# Patient Record
Sex: FEMALE | Race: WHITE | NOT HISPANIC OR LATINO | Employment: FULL TIME | URBAN - METROPOLITAN AREA
[De-identification: names, ages, dates, MRNs, and addresses within clinical notes are randomized per-mention and may not be internally consistent; named-entity substitution may affect disease eponyms.]

---

## 2017-08-31 ENCOUNTER — PATIENT OUTREACH (OUTPATIENT)
Dept: ADMINISTRATIVE | Facility: HOSPITAL | Age: 47
End: 2017-08-31

## 2017-08-31 NOTE — PROGRESS NOTES
Contact pt to schedule a physical. Pt has not been seen in our office over 12 months. Pt decline appt at this time.  Stated she has a lot going on and will call later. Letter sent.

## 2017-08-31 NOTE — LETTER
August 31, 2017    Amita Bhakta  [address] [address]           [facility]  [address]  [place] [address]  Phone: [phone] Dear Amita Bhakta    In the spirit of maintaining your good health, our system indicates that you have not been seen in the office in over 12 months and due for a visit.     Our system indicates that you are due for the following:   Health Maintenance Due   Topic Date Due    TETANUS VACCINE  10/05/1989    Influenza Vaccine  08/01/2017     Beltran Velasco MD would like you to schedule an appointment for an annual exam at your earliest convenience. If you have completed any of these health maintenance requirements at an outside facility, please contact our office so we may update your health record.    If your PRIMARY CARE PHYSICIAN has changed please contact your insurance company to reflect the correct physician.    If you have any issues or need assistance in scheduling this appointment, please call the number below.     FELIX Arora  Care Coordination Department  [facility]  [phone]

## 2018-06-07 ENCOUNTER — OFFICE VISIT (OUTPATIENT)
Dept: FAMILY MEDICINE | Facility: CLINIC | Age: 48
End: 2018-06-07
Payer: COMMERCIAL

## 2018-06-07 ENCOUNTER — LAB VISIT (OUTPATIENT)
Dept: LAB | Facility: HOSPITAL | Age: 48
End: 2018-06-07
Attending: NURSE PRACTITIONER
Payer: COMMERCIAL

## 2018-06-07 VITALS
DIASTOLIC BLOOD PRESSURE: 72 MMHG | TEMPERATURE: 98 F | BODY MASS INDEX: 25.04 KG/M2 | SYSTOLIC BLOOD PRESSURE: 113 MMHG | HEART RATE: 83 BPM | HEIGHT: 66 IN | WEIGHT: 155.81 LBS

## 2018-06-07 DIAGNOSIS — Z12.31 ENCOUNTER FOR SCREENING MAMMOGRAM FOR BREAST CANCER: ICD-10-CM

## 2018-06-07 DIAGNOSIS — R53.83 FATIGUE, UNSPECIFIED TYPE: Primary | ICD-10-CM

## 2018-06-07 DIAGNOSIS — R53.83 FATIGUE, UNSPECIFIED TYPE: ICD-10-CM

## 2018-06-07 DIAGNOSIS — R00.2 PALPITATIONS: ICD-10-CM

## 2018-06-07 LAB
ALBUMIN SERPL BCP-MCNC: 4 G/DL
ALP SERPL-CCNC: 57 U/L
ALT SERPL W/O P-5'-P-CCNC: 15 U/L
ANION GAP SERPL CALC-SCNC: 9 MMOL/L
AST SERPL-CCNC: 18 U/L
BASOPHILS # BLD AUTO: 0.04 K/UL
BASOPHILS NFR BLD: 0.8 %
BILIRUB SERPL-MCNC: 0.8 MG/DL
BILIRUB UR QL STRIP: NEGATIVE
BUN SERPL-MCNC: 19 MG/DL
CALCIUM SERPL-MCNC: 9.4 MG/DL
CHLORIDE SERPL-SCNC: 105 MMOL/L
CLARITY UR: CLEAR
CO2 SERPL-SCNC: 27 MMOL/L
COLOR UR: YELLOW
CREAT SERPL-MCNC: 0.9 MG/DL
DIFFERENTIAL METHOD: ABNORMAL
EOSINOPHIL # BLD AUTO: 0.1 K/UL
EOSINOPHIL NFR BLD: 2.7 %
ERYTHROCYTE [DISTWIDTH] IN BLOOD BY AUTOMATED COUNT: 12.4 %
EST. GFR  (AFRICAN AMERICAN): >60 ML/MIN/1.73 M^2
EST. GFR  (NON AFRICAN AMERICAN): >60 ML/MIN/1.73 M^2
FSH SERPL-ACNC: 117.2 MIU/ML
GLUCOSE SERPL-MCNC: 87 MG/DL
GLUCOSE UR QL STRIP: NEGATIVE
HCT VFR BLD AUTO: 40.5 %
HGB BLD-MCNC: 13.3 G/DL
HGB UR QL STRIP: NEGATIVE
IMM GRANULOCYTES # BLD AUTO: 0.01 K/UL
IMM GRANULOCYTES NFR BLD AUTO: 0.2 %
KETONES UR QL STRIP: NEGATIVE
LEUKOCYTE ESTERASE UR QL STRIP: NEGATIVE
LH SERPL-ACNC: 52.2 MIU/ML
LYMPHOCYTES # BLD AUTO: 1.2 K/UL
LYMPHOCYTES NFR BLD: 25.9 %
MCH RBC QN AUTO: 32.5 PG
MCHC RBC AUTO-ENTMCNC: 32.8 G/DL
MCV RBC AUTO: 99 FL
MONOCYTES # BLD AUTO: 0.4 K/UL
MONOCYTES NFR BLD: 7.6 %
NEUTROPHILS # BLD AUTO: 3 K/UL
NEUTROPHILS NFR BLD: 62.8 %
NITRITE UR QL STRIP: NEGATIVE
NRBC BLD-RTO: 0 /100 WBC
PH UR STRIP: 6.5 [PH] (ref 5–8)
PLATELET # BLD AUTO: 174 K/UL
PMV BLD AUTO: 11.5 FL
POTASSIUM SERPL-SCNC: 4.5 MMOL/L
PROT SERPL-MCNC: 7.8 G/DL
PROT UR QL STRIP: NEGATIVE
RBC # BLD AUTO: 4.09 M/UL
SODIUM SERPL-SCNC: 141 MMOL/L
SP GR UR STRIP: 1.01 (ref 1–1.03)
TSH SERPL DL<=0.005 MIU/L-ACNC: 2 UIU/ML
URN SPEC COLLECT METH UR: NORMAL
VIT B12 SERPL-MCNC: 990 PG/ML
WBC # BLD AUTO: 4.75 K/UL

## 2018-06-07 PROCEDURE — 81002 URINALYSIS NONAUTO W/O SCOPE: CPT | Mod: PO

## 2018-06-07 PROCEDURE — 83001 ASSAY OF GONADOTROPIN (FSH): CPT

## 2018-06-07 PROCEDURE — 93005 ELECTROCARDIOGRAM TRACING: CPT | Mod: S$GLB,,, | Performed by: NURSE PRACTITIONER

## 2018-06-07 PROCEDURE — 85025 COMPLETE CBC W/AUTO DIFF WBC: CPT

## 2018-06-07 PROCEDURE — 99999 PR PBB SHADOW E&M-EST. PATIENT-LVL III: CPT | Mod: PBBFAC,,, | Performed by: NURSE PRACTITIONER

## 2018-06-07 PROCEDURE — 84443 ASSAY THYROID STIM HORMONE: CPT

## 2018-06-07 PROCEDURE — 36415 COLL VENOUS BLD VENIPUNCTURE: CPT | Mod: PO

## 2018-06-07 PROCEDURE — 3008F BODY MASS INDEX DOCD: CPT | Mod: CPTII,S$GLB,, | Performed by: NURSE PRACTITIONER

## 2018-06-07 PROCEDURE — 99203 OFFICE O/P NEW LOW 30 MIN: CPT | Mod: 25,S$GLB,, | Performed by: NURSE PRACTITIONER

## 2018-06-07 PROCEDURE — 83002 ASSAY OF GONADOTROPIN (LH): CPT

## 2018-06-07 PROCEDURE — 80053 COMPREHEN METABOLIC PANEL: CPT

## 2018-06-07 PROCEDURE — 82607 VITAMIN B-12: CPT

## 2018-06-07 PROCEDURE — 93010 ELECTROCARDIOGRAM REPORT: CPT | Mod: S$GLB,,, | Performed by: NUCLEAR MEDICINE

## 2018-06-07 RX ORDER — PHENTERMINE HYDROCHLORIDE 37.5 MG/1
37.5 TABLET ORAL DAILY
Refills: 0 | COMMUNITY
Start: 2018-05-30 | End: 2019-06-18

## 2018-06-07 NOTE — PROGRESS NOTES
"Subjective:      Patient ID: Amita Bhakta is a 47 y.o. female.    Chief Complaint: Fatigue and Headache    HPI   Patient to clinic with complaint of fatigue.  She reports she has 1st noticed she was tiring easily around May 28th.  She reports she has progressively gotten worse since that time.  She has had anemia due to gastric ulcer in the past and she reports she felt the same way at that time.  She denies upset stomach, blood in stool, dark stools, abdominal pain, acid reflux.  She reports she has no energy to do anything.  She also reports that she feels like her heart rate races when she gets up to begin doing anything after being sedentary for some time.  She reports that it feels like her heart is racing away.  She denies shortness of breath and chest pain.  She reports the only other thing she can think of associated with this is having urinary frequency.  She denies fever, lower abdominal pain, flank pain, hematuria, dysuria, urgency.  She has had hysterectomy, but her ovaries are intact. She reports she has previously been experiencing hot flashes but they have now resolved.  She complains of no other symptoms today.     Review of Systems   Constitutional: Positive for activity change and fatigue. Negative for appetite change, chills and fever.   Respiratory: Negative for cough, shortness of breath and wheezing.    Cardiovascular: Positive for palpitations. Negative for chest pain and leg swelling.   Gastrointestinal: Negative for abdominal distention, abdominal pain and blood in stool.   Genitourinary: Positive for frequency. Negative for difficulty urinating, dysuria, flank pain, hematuria and urgency.   Skin: Negative for rash and wound.   Neurological: Negative for weakness and numbness.   Psychiatric/Behavioral: The patient is not nervous/anxious.        Objective:     /72   Pulse 83   Temp 98.2 °F (36.8 °C)   Ht 5' 5.5" (1.664 m)   Wt 70.7 kg (155 lb 12.8 oz)   BMI 25.53 kg/m² "     Physical Exam   Constitutional: She is oriented to person, place, and time. She appears well-developed and well-nourished. No distress.   HENT:   Head: Normocephalic and atraumatic.   Right Ear: Tympanic membrane normal.   Left Ear: Tympanic membrane normal.   Nose: Nose normal. Right sinus exhibits no maxillary sinus tenderness and no frontal sinus tenderness. Left sinus exhibits no maxillary sinus tenderness and no frontal sinus tenderness.   Mouth/Throat: Oropharynx is clear and moist and mucous membranes are normal.   Eyes: EOM are normal. Pupils are equal, round, and reactive to light.   Neck: Normal range of motion. Neck supple. Carotid bruit is not present.   Cardiovascular: Normal rate, regular rhythm and normal heart sounds.    Pulses:       Carotid pulses are 2+ on the right side, and 2+ on the left side.       Dorsalis pedis pulses are 2+ on the right side, and 2+ on the left side.        Posterior tibial pulses are 2+ on the right side, and 2+ on the left side.   Pulmonary/Chest: Effort normal and breath sounds normal. No respiratory distress. She has no wheezes. She has no rhonchi. She has no rales.   Abdominal: Soft. Bowel sounds are normal. There is no tenderness.   Musculoskeletal: Normal range of motion. She exhibits no edema.   Lymphadenopathy:     She has no cervical adenopathy.   Neurological: She is alert and oriented to person, place, and time.   Skin: Skin is warm and dry. No rash noted.   Psychiatric: She has a normal mood and affect. Her behavior is normal. Judgment and thought content normal.   Vitals reviewed.    Assessment:     1. Fatigue, unspecified type    2. Palpitations    3. Encounter for screening mammogram for breast cancer        Plan:     Problem List Items Addressed This Visit     None      Visit Diagnoses     Fatigue, unspecified type    -  Primary    Relevant Orders    Comprehensive metabolic panel (Completed)    CBC auto differential (Completed)    Urinalysis (Completed)     TSH (Completed)    IN OFFICE EKG 12-LEAD (to Muse)    Vitamin B12 (Completed)    Follicle stimulating hormone (Completed)    Luteinizing hormone (Completed)    Palpitations        Relevant Orders    IN OFFICE EKG 12-LEAD (to Muse)    Encounter for screening mammogram for breast cancer        Relevant Orders    Mammo Digital Screening Bilateral With CAD      Hydrate well  Eat at regular intervals  Report to ER if symptoms worsen    Follow-up if symptoms worsen or fail to improve.

## 2018-06-08 ENCOUNTER — TELEPHONE (OUTPATIENT)
Dept: FAMILY MEDICINE | Facility: CLINIC | Age: 48
End: 2018-06-08

## 2018-06-08 NOTE — TELEPHONE ENCOUNTER
Pt would like to know what the plan is regarding her lab results. States she knows she is post menopausal. Does she need to see someone regarding this. Please advisel.

## 2018-06-08 NOTE — TELEPHONE ENCOUNTER
----- Message from Madison Brand sent at 6/8/2018  3:42 PM CDT -----  Contact: PT   Pt request call back to discuss her test results and her next steps.. [phone] .[phone] (home)

## 2018-06-11 ENCOUNTER — TELEPHONE (OUTPATIENT)
Dept: FAMILY MEDICINE | Facility: CLINIC | Age: 48
End: 2018-06-11

## 2018-06-11 NOTE — TELEPHONE ENCOUNTER
Spoke w/pt and she has an appointment scheduled for 6/18/18 with Faith Green. Faxed lab results to his office via computer per pt request.

## 2018-06-11 NOTE — TELEPHONE ENCOUNTER
----- Message from Brittney Robledo sent at 6/11/2018  8:35 AM CDT -----  Contact: pt  She's returning a missed call, please advise [phone]

## 2018-06-14 ENCOUNTER — HOSPITAL ENCOUNTER (OUTPATIENT)
Dept: RADIOLOGY | Facility: HOSPITAL | Age: 48
Discharge: HOME | End: 2018-06-14
Attending: NURSE PRACTITIONER
Payer: COMMERCIAL

## 2018-06-14 VITALS — BODY MASS INDEX: 25.05 KG/M2 | HEIGHT: 66 IN | WEIGHT: 155.88 LBS

## 2018-06-14 DIAGNOSIS — Z12.31 ENCOUNTER FOR SCREENING MAMMOGRAM FOR BREAST CANCER: ICD-10-CM

## 2018-06-14 PROCEDURE — 77063 BREAST TOMOSYNTHESIS BI: CPT | Mod: 26,,, | Performed by: RADIOLOGY

## 2018-06-14 PROCEDURE — 77067 SCR MAMMO BI INCL CAD: CPT | Mod: 26,,, | Performed by: RADIOLOGY

## 2018-06-14 PROCEDURE — 77067 SCR MAMMO BI INCL CAD: CPT | Mod: TC,PO

## 2019-06-18 ENCOUNTER — OFFICE VISIT (OUTPATIENT)
Dept: FAMILY MEDICINE | Facility: CLINIC | Age: 49
End: 2019-06-18
Payer: COMMERCIAL

## 2019-06-18 VITALS
SYSTOLIC BLOOD PRESSURE: 116 MMHG | BODY MASS INDEX: 25.49 KG/M2 | WEIGHT: 153 LBS | DIASTOLIC BLOOD PRESSURE: 66 MMHG | HEART RATE: 80 BPM | TEMPERATURE: 98 F | HEIGHT: 65 IN

## 2019-06-18 DIAGNOSIS — K25.3 ACUTE GASTRIC ULCER WITHOUT HEMORRHAGE OR PERFORATION: ICD-10-CM

## 2019-06-18 DIAGNOSIS — F41.9 ANXIETY: Primary | ICD-10-CM

## 2019-06-18 DIAGNOSIS — Z63.4 BEREAVEMENT: ICD-10-CM

## 2019-06-18 PROCEDURE — 3008F BODY MASS INDEX DOCD: CPT | Mod: CPTII,S$GLB,, | Performed by: FAMILY MEDICINE

## 2019-06-18 PROCEDURE — 99214 PR OFFICE/OUTPT VISIT, EST, LEVL IV, 30-39 MIN: ICD-10-PCS | Mod: S$GLB,,, | Performed by: FAMILY MEDICINE

## 2019-06-18 PROCEDURE — 99214 OFFICE O/P EST MOD 30 MIN: CPT | Mod: S$GLB,,, | Performed by: FAMILY MEDICINE

## 2019-06-18 PROCEDURE — 3008F PR BODY MASS INDEX (BMI) DOCUMENTED: ICD-10-PCS | Mod: CPTII,S$GLB,, | Performed by: FAMILY MEDICINE

## 2019-06-18 PROCEDURE — 99999 PR PBB SHADOW E&M-EST. PATIENT-LVL III: CPT | Mod: PBBFAC,,, | Performed by: FAMILY MEDICINE

## 2019-06-18 PROCEDURE — 99999 PR PBB SHADOW E&M-EST. PATIENT-LVL III: ICD-10-PCS | Mod: PBBFAC,,, | Performed by: FAMILY MEDICINE

## 2019-06-18 RX ORDER — ALPRAZOLAM 0.25 MG/1
0.25 TABLET ORAL 3 TIMES DAILY PRN
Qty: 15 TABLET | Refills: 0 | Status: SHIPPED | OUTPATIENT
Start: 2019-06-18 | End: 2021-04-14 | Stop reason: SDUPTHER

## 2019-06-18 RX ORDER — PANTOPRAZOLE SODIUM 40 MG/1
TABLET, DELAYED RELEASE ORAL
Refills: 5 | COMMUNITY
Start: 2019-05-14 | End: 2020-05-05 | Stop reason: SDUPTHER

## 2019-06-18 RX ORDER — SERTRALINE HYDROCHLORIDE 50 MG/1
TABLET, FILM COATED ORAL
Qty: 30 TABLET | Refills: 5 | Status: SHIPPED | OUTPATIENT
Start: 2019-06-18 | End: 2020-01-15

## 2019-06-18 RX ORDER — ESTRADIOL 2 MG/1
2 TABLET ORAL DAILY
COMMUNITY

## 2019-06-18 RX ORDER — SUCRALFATE 1 G/10ML
SUSPENSION ORAL
Refills: 6 | COMMUNITY
Start: 2019-04-18 | End: 2021-04-28

## 2019-06-18 SDOH — SOCIAL DETERMINANTS OF HEALTH (SDOH): DISSAPEARANCE AND DEATH OF FAMILY MEMBER: Z63.4

## 2019-06-18 NOTE — PROGRESS NOTES
Patient presents with a complaint of anxiety and depression symptoms.  She has had some chronic anxiety.  Previous treatment with Zoloft about 5 years ago.  Previous counseling 20 years ago with a divorce.  She had a lot of stress during the past year with her son recovering from drug addiction.  Her mother passed away from a stroke this past week which has caused significant increased symptoms.  Did not feel like she was depressed prior to this.  She denies SI/HI.  She wants to take a couple of months off with FMLA/sick leave    We did discuss gastric ulcer.  Symptoms apparently had improved, possibly recurring.  She is due for follow-up endoscopy.  She is scheduling this through her GI doctor.    Amita was seen today for depression and anxiety.    Diagnoses and all orders for this visit:    Anxiety    Bereavement    Acute gastric ulcer without hemorrhage or perforation    Other orders  -     sertraline (ZOLOFT) 50 MG tablet; Take 1/2 po daily for 1 week, then take 1 daily  -     ALPRAZolam (XANAX) 0.25 MG tablet; Take 1 tablet (0.25 mg total) by mouth 3 (three) times daily as needed for Anxiety.     [place] paperwork off until July 22nd.  follow-up appointment in 1 month.  I would encourage her being able to get back to regular work and normal functioning as soon as practicable.  Recommend check into formal counseling with someone in her insurance network.  Follow-up with GI    25 min greater than 50% counseling            Past Medical History:  Past Medical History:   Diagnosis Date    Anxiety     Depression     Gastric ulcer with hemorrhage     H pylori ulcer     History of blood transfusion     Migraine headache      Past Surgical History:   Procedure Laterality Date    BREAST BIOPSY      HAND TENDON SURGERY      HYSTERECTOMY      ovaries intact    TYMPANOSTOMY      TYMPANOSTOMY TUBE PLACEMENT      urethral dilation       Review of patient's allergies indicates:   Allergen Reactions    Penicillins       Other reaction(s): Unknown     Current Outpatient Medications on File Prior to Visit   Medication Sig Dispense Refill    acetaminophen (TYLENOL) 325 MG tablet Take 325 mg by mouth every 6 (six) hours as needed for Pain.      CARAFATE 100 mg/mL suspension TAKE TWO TEASPOONSFUL (10 MLS) BY MOUTH FOUR TIMES DAILY  6    estradiol (ESTRACE) 2 MG tablet Take 2 mg by mouth once daily.      pantoprazole (PROTONIX) 40 MG tablet TAKE 1 TABLET BY MOUTH 30 MINUTES before BREAKFAST EVERY DAY  5    prasterone, dhea, (INTRAROSA) 6.5 mg Inst Place 6.5 mg vaginally once daily.      [DISCONTINUED] phentermine (ADIPEX-P) 37.5 mg tablet Take 37.5 mg by mouth once daily.  0     No current facility-administered medications on file prior to visit.      Social History     Socioeconomic History    Marital status:      Spouse name: Not on file    Number of children: Not on file    Years of education: Not on file    Highest education level: Not on file   Occupational History     Employer: [employer]   Social Needs    Financial resource strain: Not on file    Food insecurity:     Worry: Not on file     Inability: Not on file    Transportation needs:     Medical: Not on file     Non-medical: Not on file   Tobacco Use    Smoking status: Never Smoker   Substance and Sexual Activity    Alcohol use: Yes     Comment: occ    Drug use: No    Sexual activity: Yes     Partners: Male   Lifestyle    Physical activity:     Days per week: Not on file     Minutes per session: Not on file    Stress: Not on file   Relationships    Social connections:     Talks on phone: Not on file     Gets together: Not on file     Attends Hindu service: Not on file     Active member of club or organization: Not on file     Attends meetings of clubs or organizations: Not on file     Relationship status: Not on file   Other Topics Concern    Not on file   Social History Narrative    Not on file     Family History   Problem Relation Age  "of Onset    Heart disease Maternal Grandmother     Diabetes Maternal Grandmother     Hypertension Father     Hypertension Mother     Stroke Mother 72           ROS:  GENERAL: No fever, chills,  or significant weight changes.   CARDIOVASCULAR: Denies chest pain, PND, orthopnea or reduced exercise tolerance.  ABDOMEN: Appetite diminished.   URINARY: No flank pain, dysuria or hematuria.    Vitals:    06/18/19 1039   BP: 116/66   Pulse: 80   Temp: 98.1 °F (36.7 °C)   TempSrc: Oral   Weight: 69.4 kg (153 lb)   Height: 5' 5" (1.651 m)       Wt Readings from Last 3 Encounters:   06/18/19 69.4 kg (153 lb)   06/14/18 70.7 kg (155 lb 13.8 oz)   06/07/18 70.7 kg (155 lb 12.8 oz)       APPEARANCE: Well nourished, well developed, in no acute distress.    MENTAL STATUS: Alert.  Oriented x 3.  "

## 2019-07-12 ENCOUNTER — OFFICE VISIT (OUTPATIENT)
Dept: FAMILY MEDICINE | Facility: CLINIC | Age: 49
End: 2019-07-12
Payer: COMMERCIAL

## 2019-07-12 ENCOUNTER — HOSPITAL ENCOUNTER (OUTPATIENT)
Dept: RADIOLOGY | Facility: HOSPITAL | Age: 49
Discharge: HOME | End: 2019-07-12
Attending: NURSE PRACTITIONER
Payer: COMMERCIAL

## 2019-07-12 VITALS
HEART RATE: 60 BPM | WEIGHT: 153.88 LBS | DIASTOLIC BLOOD PRESSURE: 64 MMHG | SYSTOLIC BLOOD PRESSURE: 100 MMHG | BODY MASS INDEX: 24.73 KG/M2 | TEMPERATURE: 98 F | HEIGHT: 66 IN

## 2019-07-12 DIAGNOSIS — R53.83 FATIGUE, UNSPECIFIED TYPE: ICD-10-CM

## 2019-07-12 DIAGNOSIS — B34.9 VIRAL ILLNESS: ICD-10-CM

## 2019-07-12 DIAGNOSIS — I49.9 IRREGULAR HEART RATE: ICD-10-CM

## 2019-07-12 DIAGNOSIS — Z09 FOLLOW UP: Primary | ICD-10-CM

## 2019-07-12 DIAGNOSIS — R05.9 COUGH: ICD-10-CM

## 2019-07-12 DIAGNOSIS — R52 BODY ACHES: ICD-10-CM

## 2019-07-12 DIAGNOSIS — R21 RASH: ICD-10-CM

## 2019-07-12 DIAGNOSIS — R51.9 NONINTRACTABLE HEADACHE, UNSPECIFIED CHRONICITY PATTERN, UNSPECIFIED HEADACHE TYPE: ICD-10-CM

## 2019-07-12 PROCEDURE — 99999 PR PBB SHADOW E&M-EST. PATIENT-LVL IV: CPT | Mod: PBBFAC,,, | Performed by: NURSE PRACTITIONER

## 2019-07-12 PROCEDURE — 99214 PR OFFICE/OUTPT VISIT, EST, LEVL IV, 30-39 MIN: ICD-10-PCS | Mod: S$GLB,,, | Performed by: NURSE PRACTITIONER

## 2019-07-12 PROCEDURE — 3008F PR BODY MASS INDEX (BMI) DOCUMENTED: ICD-10-PCS | Mod: CPTII,S$GLB,, | Performed by: NURSE PRACTITIONER

## 2019-07-12 PROCEDURE — 71046 X-RAY EXAM CHEST 2 VIEWS: CPT | Mod: 26,,, | Performed by: RADIOLOGY

## 2019-07-12 PROCEDURE — 71046 X-RAY EXAM CHEST 2 VIEWS: CPT | Mod: TC,PO

## 2019-07-12 PROCEDURE — 71046 XR CHEST PA AND LATERAL: ICD-10-PCS | Mod: 26,,, | Performed by: RADIOLOGY

## 2019-07-12 PROCEDURE — 93005 ELECTROCARDIOGRAM TRACING: CPT | Mod: S$GLB,,, | Performed by: NURSE PRACTITIONER

## 2019-07-12 PROCEDURE — 99214 OFFICE O/P EST MOD 30 MIN: CPT | Mod: S$GLB,,, | Performed by: NURSE PRACTITIONER

## 2019-07-12 PROCEDURE — 99999 PR PBB SHADOW E&M-EST. PATIENT-LVL IV: ICD-10-PCS | Mod: PBBFAC,,, | Performed by: NURSE PRACTITIONER

## 2019-07-12 PROCEDURE — 93010 EKG 12-LEAD: ICD-10-PCS | Mod: S$GLB,,, | Performed by: INTERNAL MEDICINE

## 2019-07-12 PROCEDURE — 3008F BODY MASS INDEX DOCD: CPT | Mod: CPTII,S$GLB,, | Performed by: NURSE PRACTITIONER

## 2019-07-12 PROCEDURE — 93010 ELECTROCARDIOGRAM REPORT: CPT | Mod: S$GLB,,, | Performed by: INTERNAL MEDICINE

## 2019-07-12 PROCEDURE — 93005 EKG 12-LEAD: ICD-10-PCS | Mod: S$GLB,,, | Performed by: NURSE PRACTITIONER

## 2019-07-12 RX ORDER — HYDROXYZINE HYDROCHLORIDE 25 MG/1
25 TABLET, FILM COATED ORAL 2 TIMES DAILY PRN
Qty: 14 TABLET | Refills: 0 | Status: SHIPPED | OUTPATIENT
Start: 2019-07-12 | End: 2019-07-19

## 2019-07-12 RX ORDER — BUTALBITAL, ACETAMINOPHEN AND CAFFEINE 50; 325; 40 MG/1; MG/1; MG/1
1 TABLET ORAL
COMMUNITY
Start: 2019-07-09 | End: 2019-07-30

## 2019-07-12 RX ORDER — BENZONATATE 200 MG/1
200 CAPSULE ORAL 3 TIMES DAILY PRN
Qty: 30 CAPSULE | Refills: 0 | Status: SHIPPED | OUTPATIENT
Start: 2019-07-12 | End: 2019-07-22

## 2019-07-12 RX ORDER — METHYLPREDNISOLONE 4 MG/1
TABLET ORAL
Qty: 1 PACKAGE | Refills: 0 | Status: SHIPPED | OUTPATIENT
Start: 2019-07-12 | End: 2019-07-18

## 2019-07-12 NOTE — PROGRESS NOTES
Subjective:       Patient ID: Amita Bhakta is a 48 y.o. female.    Chief Complaint: Hospital Follow Up ([facility])  Pt in today for ER follow up. Pt went to [facility] ER on Tuesday after experiencing rash, weakness, HA. Pt states that she had a cough, body aches a few days prior to her ER visit, however the symptoms subsided then returned. CBC, CMP, UA, monospot done at ER; unremarkable. Pt prescribed zofran, toradol at ER and given IV fluid.  Pt states body aches resolved and rash resolving, however requests testing for lupus; still having fatigue, HA. Pt also recently seen by PCP for grief, stress; mother passed away last month and also has other family issues at home; states medication helping some. Counseling recommend; states will consider, however would like to f/u with PCP as scheduled next week before scheduling. Denies SI/HI. Pt has no other complaints today.  Past Medical History:   Diagnosis Date    Anxiety     Depression     Gastric ulcer with hemorrhage     H pylori ulcer     History of blood transfusion     Migraine headache      Social History     Socioeconomic History    Marital status:      Spouse name: Not on file    Number of children: Not on file    Years of education: Not on file    Highest education level: Not on file   Occupational History     Employer: [employer]   Social Needs    Financial resource strain: Not on file    Food insecurity:     Worry: Not on file     Inability: Not on file    Transportation needs:     Medical: Not on file     Non-medical: Not on file   Tobacco Use    Smoking status: Never Smoker   Substance and Sexual Activity    Alcohol use: Yes     Comment: occ    Drug use: No    Sexual activity: Yes     Partners: Male   Lifestyle    Physical activity:     Days per week: Not on file     Minutes per session: Not on file    Stress: Not on file   Relationships    Social connections:     Talks on phone: Not on file     Gets together: Not  on file     Attends Spiritism service: Not on file     Active member of club or organization: Not on file     Attends meetings of clubs or organizations: Not on file     Relationship status: Not on file   Other Topics Concern    Not on file   Social History Narrative    Not on file     Past Surgical History:   Procedure Laterality Date    BREAST BIOPSY      HAND TENDON SURGERY      HYSTERECTOMY      ovaries intact    TYMPANOSTOMY      TYMPANOSTOMY TUBE PLACEMENT      urethral dilation         HPI  Review of Systems   Constitutional: Positive for fatigue.   HENT: Negative.    Eyes: Negative.    Respiratory: Positive for cough.    Cardiovascular: Negative.    Gastrointestinal: Negative.    Endocrine: Negative.    Genitourinary: Negative.    Musculoskeletal: Negative.    Skin: Negative.    Allergic/Immunologic: Negative.    Neurological: Positive for headaches.   Psychiatric/Behavioral: Negative.        Objective:      Physical Exam   Constitutional: She is oriented to person, place, and time. She appears well-developed and well-nourished.   HENT:   Head: Normocephalic.   Right Ear: External ear normal.   Left Ear: External ear normal.   Nose: Nose normal.   Mouth/Throat: Oropharynx is clear and moist.   Eyes: Pupils are equal, round, and reactive to light. Conjunctivae are normal.   Neck: Normal range of motion. Neck supple.   Cardiovascular: Normal rate. A regularly irregular rhythm present.   Pulmonary/Chest: Effort normal and breath sounds normal.   Abdominal: Soft. Bowel sounds are normal.   Musculoskeletal: Normal range of motion.   Neurological: She is alert and oriented to person, place, and time.   Skin: Skin is warm and dry. Capillary refill takes 2 to 3 seconds.   Psychiatric: She has a normal mood and affect. Her behavior is normal. Judgment and thought content normal.       Assessment:       1. Follow up    2. Viral illness    3. Rash    4. Fatigue, unspecified type    5. Irregular heart rate    6.  Nonintractable headache, unspecified chronicity pattern, unspecified headache type    7. Body aches    8. Cough        Plan:           Amita was seen today for hospital follow up.    Diagnoses and all orders for this visit:    Follow up  Viral illness  Rash  Fatigue, unspecified type  Irregular heart rate  Nonintractable headache, unspecified chronicity pattern, unspecified headache type  Body aches  Cough  -     CBC auto differential; Future  -     TSH; Future  -     IN OFFICE EKG 12-LEAD (to Muse)  -     BEULAH Screen w/Reflex; Future  -     Rheumatoid factor; Future  -     Uric acid; Future  -     X-Ray Chest PA And Lateral; Future  -     methylPREDNISolone (MEDROL DOSEPACK) 4 mg tablet; use as directed  -     benzonatate (TESSALON) 200 MG capsule; Take 1 capsule (200 mg total) by mouth 3 (three) times daily as needed.  -     hydrOXYzine HCl (ATARAX) 25 MG tablet; Take 1 tablet (25 mg total) by mouth 2 (two) times daily as needed for Itching.  Report to ER immediately if symptoms worsen

## 2019-07-15 ENCOUNTER — TELEPHONE (OUTPATIENT)
Dept: FAMILY MEDICINE | Facility: CLINIC | Age: 49
End: 2019-07-15

## 2019-07-15 DIAGNOSIS — R94.31 EKG, ABNORMAL: Primary | ICD-10-CM

## 2019-07-15 NOTE — TELEPHONE ENCOUNTER
----- Message from Julia Lynne NP sent at 7/15/2019  1:07 PM CDT -----  Reviewed; EKG borderline abnormal. Recommend cardiology for further evaluation, considering symptoms

## 2019-07-18 ENCOUNTER — LAB VISIT (OUTPATIENT)
Dept: LAB | Facility: HOSPITAL | Age: 49
End: 2019-07-18
Attending: FAMILY MEDICINE
Payer: COMMERCIAL

## 2019-07-18 ENCOUNTER — OFFICE VISIT (OUTPATIENT)
Dept: FAMILY MEDICINE | Facility: CLINIC | Age: 49
End: 2019-07-18
Payer: COMMERCIAL

## 2019-07-18 VITALS
HEIGHT: 66 IN | BODY MASS INDEX: 24.4 KG/M2 | WEIGHT: 151.81 LBS | HEART RATE: 80 BPM | SYSTOLIC BLOOD PRESSURE: 110 MMHG | DIASTOLIC BLOOD PRESSURE: 80 MMHG

## 2019-07-18 DIAGNOSIS — B34.9 VIRAL SYNDROME: ICD-10-CM

## 2019-07-18 DIAGNOSIS — D75.89 MACROCYTOSIS: ICD-10-CM

## 2019-07-18 DIAGNOSIS — R53.83 FATIGUE, UNSPECIFIED TYPE: ICD-10-CM

## 2019-07-18 DIAGNOSIS — F41.9 ANXIETY: ICD-10-CM

## 2019-07-18 DIAGNOSIS — B34.9 VIRAL SYNDROME: Primary | ICD-10-CM

## 2019-07-18 LAB
BASOPHILS # BLD AUTO: 0.09 K/UL (ref 0–0.2)
BASOPHILS NFR BLD: 1.4 % (ref 0–1.9)
BILIRUB UR QL STRIP: NEGATIVE
CLARITY UR: CLEAR
COLOR UR: YELLOW
CRP SERPL-MCNC: 2.1 MG/L (ref 0–8.2)
DIFFERENTIAL METHOD: ABNORMAL
EOSINOPHIL # BLD AUTO: 0.1 K/UL (ref 0–0.5)
EOSINOPHIL NFR BLD: 1.7 % (ref 0–8)
ERYTHROCYTE [DISTWIDTH] IN BLOOD BY AUTOMATED COUNT: 12 % (ref 11.5–14.5)
ERYTHROCYTE [SEDIMENTATION RATE] IN BLOOD BY WESTERGREN METHOD: 8 MM/HR (ref 0–20)
FOLATE SERPL-MCNC: 17.5 NG/ML (ref 4–24)
GLUCOSE UR QL STRIP: NEGATIVE
HCT VFR BLD AUTO: 48.1 % (ref 37–48.5)
HGB BLD-MCNC: 15.2 G/DL (ref 12–16)
HGB UR QL STRIP: NEGATIVE
IMM GRANULOCYTES # BLD AUTO: 0.02 K/UL (ref 0–0.04)
IMM GRANULOCYTES NFR BLD AUTO: 0.3 % (ref 0–0.5)
KETONES UR QL STRIP: NEGATIVE
LEUKOCYTE ESTERASE UR QL STRIP: NEGATIVE
LYMPHOCYTES # BLD AUTO: 2 K/UL (ref 1–4.8)
LYMPHOCYTES NFR BLD: 32.1 % (ref 18–48)
MCH RBC QN AUTO: 32.7 PG (ref 27–31)
MCHC RBC AUTO-ENTMCNC: 31.6 G/DL (ref 32–36)
MCV RBC AUTO: 103 FL (ref 82–98)
MONOCYTES # BLD AUTO: 0.4 K/UL (ref 0.3–1)
MONOCYTES NFR BLD: 5.6 % (ref 4–15)
NEUTROPHILS # BLD AUTO: 3.7 K/UL (ref 1.8–7.7)
NEUTROPHILS NFR BLD: 58.9 % (ref 38–73)
NITRITE UR QL STRIP: NEGATIVE
NRBC BLD-RTO: 0 /100 WBC
PH UR STRIP: 6 [PH] (ref 5–8)
PLATELET # BLD AUTO: 334 K/UL (ref 150–350)
PMV BLD AUTO: 10.9 FL (ref 9.2–12.9)
PROT UR QL STRIP: NEGATIVE
RBC # BLD AUTO: 4.65 M/UL (ref 4–5.4)
SP GR UR STRIP: 1.01 (ref 1–1.03)
URN SPEC COLLECT METH UR: NORMAL
VIT B12 SERPL-MCNC: >2000 PG/ML (ref 210–950)
WBC # BLD AUTO: 6.29 K/UL (ref 3.9–12.7)

## 2019-07-18 PROCEDURE — 82607 VITAMIN B-12: CPT

## 2019-07-18 PROCEDURE — 82746 ASSAY OF FOLIC ACID SERUM: CPT

## 2019-07-18 PROCEDURE — 86140 C-REACTIVE PROTEIN: CPT

## 2019-07-18 PROCEDURE — 3008F PR BODY MASS INDEX (BMI) DOCUMENTED: ICD-10-PCS | Mod: CPTII,S$GLB,, | Performed by: FAMILY MEDICINE

## 2019-07-18 PROCEDURE — 3008F BODY MASS INDEX DOCD: CPT | Mod: CPTII,S$GLB,, | Performed by: FAMILY MEDICINE

## 2019-07-18 PROCEDURE — 36415 COLL VENOUS BLD VENIPUNCTURE: CPT | Mod: PO

## 2019-07-18 PROCEDURE — 99999 PR PBB SHADOW E&M-EST. PATIENT-LVL III: ICD-10-PCS | Mod: PBBFAC,,, | Performed by: FAMILY MEDICINE

## 2019-07-18 PROCEDURE — 99214 OFFICE O/P EST MOD 30 MIN: CPT | Mod: S$GLB,,, | Performed by: FAMILY MEDICINE

## 2019-07-18 PROCEDURE — 99999 PR PBB SHADOW E&M-EST. PATIENT-LVL III: CPT | Mod: PBBFAC,,, | Performed by: FAMILY MEDICINE

## 2019-07-18 PROCEDURE — 81002 URINALYSIS NONAUTO W/O SCOPE: CPT | Mod: PO

## 2019-07-18 PROCEDURE — 86789 WEST NILE VIRUS ANTIBODY: CPT

## 2019-07-18 PROCEDURE — 85651 RBC SED RATE NONAUTOMATED: CPT | Mod: PO

## 2019-07-18 PROCEDURE — 99214 PR OFFICE/OUTPT VISIT, EST, LEVL IV, 30-39 MIN: ICD-10-PCS | Mod: S$GLB,,, | Performed by: FAMILY MEDICINE

## 2019-07-18 PROCEDURE — 85025 COMPLETE CBC W/AUTO DIFF WBC: CPT

## 2019-07-18 NOTE — PROGRESS NOTES
Patient presents follow-up.  Initially appointment was scheduled for follow-up after starting Zoloft due to anxiety symptoms after her mother passed away.  She did note some improvement of symptoms.  She has not looked into counseling yet.  Subsequent to last appointment she developed some upper respiratory symptoms with some congestion sore throat cough.  Symptoms seem to improve then she developed a rash, subjective low-grade fever, body aches, headache general fatigue.  She did go to the emergency room with negative evaluation.  She had a follow-up visit in the office with nurse practitioner.  Chest x-ray negative.  Laboratory that visit did show some macrocytosis.  She states minimal alcohol use.  The rash did resolve.  Still does not feel well.  She does feel like she gets some bladder contractions.    Amita was seen today for follow-up.    Diagnoses and all orders for this visit:    Viral syndrome  -     WEST NILE ANTIBODIES, IGG AND IGM; Future  -     CBC auto differential; Future  -     Urinalysis    Anxiety    Macrocytosis  -     CBC auto differential; Future  -     Vitamin B12; Future  -     Folate; Future    Fatigue, unspecified type  -     Sedimentation rate; Future  -     C-reactive protein; Future  -     Urinalysis      Continue current medication.  Off work 2 weeks, follow-up 2 weeks.            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    Depression     Gastric ulcer with hemorrhage     H pylori ulcer     History of blood transfusion     Migraine headache      Past Surgical History:   Procedure Laterality Date    BREAST BIOPSY      HAND TENDON SURGERY      HYSTERECTOMY      ovaries intact    TYMPANOSTOMY      TYMPANOSTOMY TUBE PLACEMENT      urethral dilation       Review of patient's allergies indicates:   Allergen Reactions    Penicillins      Other  reaction(s): Unknown     Current Outpatient Medications on File Prior to Visit   Medication Sig Dispense Refill    acetaminophen (TYLENOL) 325 MG tablet Take 325 mg by mouth every 6 (six) hours as needed for Pain.      ALPRAZolam (XANAX) 0.25 MG tablet Take 1 tablet (0.25 mg total) by mouth 3 (three) times daily as needed for Anxiety. 15 tablet 0    benzonatate (TESSALON) 200 MG capsule Take 1 capsule (200 mg total) by mouth 3 (three) times daily as needed. 30 capsule 0    butalbital-acetaminophen-caffeine -40 mg (FIORICET, ESGIC) -40 mg per tablet Take 1 tablet by mouth.      estradiol (ESTRACE) 2 MG tablet Take 2 mg by mouth once daily.      hydrOXYzine HCl (ATARAX) 25 MG tablet Take 1 tablet (25 mg total) by mouth 2 (two) times daily as needed for Itching. 14 tablet 0    pantoprazole (PROTONIX) 40 MG tablet TAKE 1 TABLET BY MOUTH 30 MINUTES before BREAKFAST EVERY DAY  5    sertraline (ZOLOFT) 50 MG tablet Take 1/2 po daily for 1 week, then take 1 daily (Patient taking differently: Take 50 mg by mouth once daily. Take 1/2 po daily for 1 week, then take 1 daily) 30 tablet 5    CARAFATE 100 mg/mL suspension TAKE TWO TEASPOONSFUL (10 MLS) BY MOUTH FOUR TIMES DAILY  6    prasterone, dhea, (INTRAROSA) 6.5 mg Inst Place 6.5 mg vaginally once daily.      [DISCONTINUED] methylPREDNISolone (MEDROL DOSEPACK) 4 mg tablet use as directed 1 Package 0     No current facility-administered medications on file prior to visit.      Social History     Socioeconomic History    Marital status:      Spouse name: Not on file    Number of children: Not on file    Years of education: Not on file    Highest education level: Not on file   Occupational History     Employer: [employer]   Social Needs    Financial resource strain: Not on file    Food insecurity:     Worry: Not on file     Inability: Not on file    Transportation needs:     Medical: Not on file     Non-medical: Not on file   Tobacco  "Use    Smoking status: Never Smoker   Substance and Sexual Activity    Alcohol use: Yes     Frequency: Monthly or less     Comment: occ 1x month    Drug use: No    Sexual activity: Yes     Partners: Male   Lifestyle    Physical activity:     Days per week: Not on file     Minutes per session: Not on file    Stress: Not on file   Relationships    Social connections:     Talks on phone: Not on file     Gets together: Not on file     Attends Scientologist service: Not on file     Active member of club or organization: Not on file     Attends meetings of clubs or organizations: Not on file     Relationship status: Not on file   Other Topics Concern    Not on file   Social History Narrative    Not on file     Family History   Problem Relation Age of Onset    Heart disease Maternal Grandmother     Diabetes Maternal Grandmother     Hypertension Father     Hypertension Mother     Stroke Mother 72           ROS:  GENERAL: No significant weight changes.   CARDIOVASCULAR: Denies chest pain, PND, orthopnea .  ABDOMEN: Denies diarrhea, abdominal pain, hematemesis or blood in stool.  URINARY: No flank pain, hematuria.    Vitals:    07/18/19 0807   BP: 110/80   Pulse: 80   Weight: 68.9 kg (151 lb 12.8 oz)   Height: 5' 5.5" (1.664 m)     Wt Readings from Last 3 Encounters:   07/18/19 68.9 kg (151 lb 12.8 oz)   07/12/19 69.8 kg (153 lb 14.1 oz)   06/18/19 69.4 kg (153 lb)       OBJECTIVE:   APPEARANCE: Well nourished, well developed, in no acute distress.    HEAD: Normocephalic.  Atraumatic.  No sinus tenderness.  EYES:   Right eye: Pupil reactive.  Conjunctiva clear.    Left eye: Pupil reactive.  Conjunctiva clear.  EOMI.    EARS: TM's intact. Light reflex normal. No retraction or perforation.    NOSE:  clear.  MOUTH & THROAT:  No pharyngeal erythema or exudate. No lesions.  NECK: Supple. No bruits.  No JVD.  No cervical lymphadenopathy.  No thyromegaly.    CHEST: Breath sounds clear bilaterally.  Normal respiratory " effort  CARDIOVASCULAR: Normal rate.  Regular rhythm.  No murmurs.  No rub.  No gallops.  ABDOMEN: Bowel sounds normal.  Soft.  No tenderness.  No organomegaly.  PERIPHERAL VASCULAR: No cyanosis.  No clubbing.  No edema.  NEUROLOGIC: No focal findings.  MENTAL STATUS: Alert.  Oriented x 3.

## 2019-07-22 LAB
WEST NILE VIRUS INTERPRETATION: NORMAL
WNV IGG SER QL IA: NEGATIVE
WNV IGM SER QL IA: NEGATIVE

## 2019-07-30 ENCOUNTER — OFFICE VISIT (OUTPATIENT)
Dept: FAMILY MEDICINE | Facility: CLINIC | Age: 49
End: 2019-07-30
Payer: COMMERCIAL

## 2019-07-30 ENCOUNTER — OFFICE VISIT (OUTPATIENT)
Dept: CARDIOLOGY | Facility: CLINIC | Age: 49
End: 2019-07-30
Payer: COMMERCIAL

## 2019-07-30 VITALS
HEART RATE: 68 BPM | WEIGHT: 150.38 LBS | SYSTOLIC BLOOD PRESSURE: 102 MMHG | WEIGHT: 150 LBS | SYSTOLIC BLOOD PRESSURE: 123 MMHG | DIASTOLIC BLOOD PRESSURE: 70 MMHG | DIASTOLIC BLOOD PRESSURE: 76 MMHG | HEIGHT: 66 IN | HEART RATE: 66 BPM | BODY MASS INDEX: 24.17 KG/M2 | BODY MASS INDEX: 24.11 KG/M2 | HEIGHT: 66 IN

## 2019-07-30 DIAGNOSIS — I49.3 PVC (PREMATURE VENTRICULAR CONTRACTION): ICD-10-CM

## 2019-07-30 DIAGNOSIS — R00.2 PALPITATIONS: ICD-10-CM

## 2019-07-30 DIAGNOSIS — F41.9 ANXIETY: Primary | ICD-10-CM

## 2019-07-30 DIAGNOSIS — D75.89 MACROCYTOSIS: ICD-10-CM

## 2019-07-30 PROCEDURE — 99244 PR OFFICE CONSULTATION,LEVEL IV: ICD-10-PCS | Mod: S$GLB,,, | Performed by: INTERNAL MEDICINE

## 2019-07-30 PROCEDURE — 99213 PR OFFICE/OUTPT VISIT, EST, LEVL III, 20-29 MIN: ICD-10-PCS | Mod: S$GLB,,, | Performed by: FAMILY MEDICINE

## 2019-07-30 PROCEDURE — 99999 PR PBB SHADOW E&M-EST. PATIENT-LVL III: CPT | Mod: PBBFAC,,, | Performed by: FAMILY MEDICINE

## 2019-07-30 PROCEDURE — 99999 PR PBB SHADOW E&M-EST. PATIENT-LVL III: ICD-10-PCS | Mod: PBBFAC,,, | Performed by: INTERNAL MEDICINE

## 2019-07-30 PROCEDURE — 3008F BODY MASS INDEX DOCD: CPT | Mod: CPTII,S$GLB,, | Performed by: FAMILY MEDICINE

## 2019-07-30 PROCEDURE — 3008F PR BODY MASS INDEX (BMI) DOCUMENTED: ICD-10-PCS | Mod: CPTII,S$GLB,, | Performed by: FAMILY MEDICINE

## 2019-07-30 PROCEDURE — 99213 OFFICE O/P EST LOW 20 MIN: CPT | Mod: S$GLB,,, | Performed by: FAMILY MEDICINE

## 2019-07-30 PROCEDURE — 99999 PR PBB SHADOW E&M-EST. PATIENT-LVL III: ICD-10-PCS | Mod: PBBFAC,,, | Performed by: FAMILY MEDICINE

## 2019-07-30 PROCEDURE — 99999 PR PBB SHADOW E&M-EST. PATIENT-LVL III: CPT | Mod: PBBFAC,,, | Performed by: INTERNAL MEDICINE

## 2019-07-30 PROCEDURE — 99244 OFF/OP CNSLTJ NEW/EST MOD 40: CPT | Mod: S$GLB,,, | Performed by: INTERNAL MEDICINE

## 2019-07-30 NOTE — PROGRESS NOTES
Subjective:   Patient ID:  Amita Bhakta is a 48 y.o. female who presents for follow-up of Constipation  Pt with sx of occasional palpitations. Pt with noted stress /anxiety at home.   EKG shows NSR, occasional PVCs. Pt has decreased caffeine.  Pt with hx of dizziness after viral syndrome 7-19.    Palpitations    This is a new problem. The current episode started 1 to 4 weeks ago. The problem occurs intermittently. The problem has been waxing and waning. The symptoms are aggravated by stress. Associated symptoms include anxiety. Pertinent negatives include no chest pain, dizziness or shortness of breath. She has tried anxiolytics for the symptoms. The treatment provided mild relief.       Review of Systems   Constitution: Negative. Negative for weight gain.   HENT: Negative.    Eyes: Negative.    Cardiovascular: Positive for palpitations. Negative for chest pain and leg swelling.   Respiratory: Negative.  Negative for shortness of breath.    Endocrine: Negative.    Hematologic/Lymphatic: Negative.    Skin: Negative.    Musculoskeletal: Negative for muscle weakness.   Gastrointestinal: Negative.    Genitourinary: Negative.    Neurological: Negative.  Negative for dizziness.   Psychiatric/Behavioral: The patient is nervous/anxious.    Allergic/Immunologic: Negative.      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    Depression     Gastric ulcer with hemorrhage     H pylori ulcer     History of blood transfusion     Migraine headache      Social History     Socioeconomic History    Marital status:      Spouse name: Not on file    Number of children: Not on file    Years of education: Not on  file    Highest education level: Not on file   Occupational History     Employer: [employer]   Social Needs    Financial resource strain: Not on file    Food insecurity:     Worry: Not on file     Inability: Not on file    Transportation needs:     Medical: Not on file     Non-medical: Not on file   Tobacco Use    Smoking status: Never Smoker   Substance and Sexual Activity    Alcohol use: Yes     Frequency: Monthly or less     Comment: occ 1x month    Drug use: No    Sexual activity: Yes     Partners: Male   Lifestyle    Physical activity:     Days per week: Not on file     Minutes per session: Not on file    Stress: Not on file   Relationships    Social connections:     Talks on phone: Not on file     Gets together: Not on file     Attends Jain service: Not on file     Active member of club or organization: Not on file     Attends meetings of clubs or organizations: Not on file     Relationship status: Not on file   Other Topics Concern    Not on file   Social History Narrative    Not on file     Current Outpatient Medications on File Prior to Visit   Medication Sig Dispense Refill    acetaminophen (TYLENOL) 325 MG tablet Take 325 mg by mouth every 6 (six) hours as needed for Pain.      ALPRAZolam (XANAX) 0.25 MG tablet Take 1 tablet (0.25 mg total) by mouth 3 (three) times daily as needed for Anxiety. 15 tablet 0    CARAFATE 100 mg/mL suspension TAKE TWO TEASPOONSFUL (10 MLS) BY MOUTH FOUR TIMES DAILY  6    estradiol (ESTRACE) 2 MG tablet Take 2 mg by mouth once daily.      pantoprazole (PROTONIX) 40 MG tablet TAKE 1 TABLET BY MOUTH 30 MINUTES before BREAKFAST EVERY DAY  5    prasterone, dhea, (INTRAROSA) 6.5 mg Inst Place 6.5 mg vaginally once daily.      sertraline (ZOLOFT) 50 MG tablet Take 1/2 po daily for 1 week, then take 1 daily (Patient taking differently: Take 50 mg by mouth once daily. Take 1/2 po daily for 1 week, then take 1 daily) 30 tablet 5    [DISCONTINUED]  butalbital-acetaminophen-caffeine -40 mg (FIORICET, ESGIC) -40 mg per tablet Take 1 tablet by mouth.       No current facility-administered medications on file prior to visit.      Review of patient's allergies indicates:   Allergen Reactions    Penicillins      Other reaction(s): Unknown       Objective:     Physical Exam   Constitutional: She is oriented to person, place, and time. She appears well-developed and well-nourished.   HENT:   Head: Normocephalic and atraumatic.   Eyes: Pupils are equal, round, and reactive to light. Conjunctivae and EOM are normal.   Neck: Normal range of motion. Neck supple.   Cardiovascular: Normal rate, regular rhythm, normal heart sounds and intact distal pulses.   Pulmonary/Chest: Effort normal and breath sounds normal.   Abdominal: Soft. Bowel sounds are normal.   Musculoskeletal: Normal range of motion.   Neurological: She is alert and oriented to person, place, and time.   Skin: Skin is warm and dry.   Psychiatric: She has a normal mood and affect.   Nursing note and vitals reviewed.      Assessment:     1. Palpitations    2.   PVCs    Plan:     Palpitations    stress management  Continued exercise

## 2019-07-30 NOTE — LETTER
July 30, 2019      [place]  [address]  [place] [address]           [facility]  [address] [facility]  [place] [address]  Phone: [phone]  Fax: [phone]          Patient: Amita Bhakta   MR Number: [id number]   YOB: 1970   Date of Visit: 7/30/2019       Dear Julia Lynne:    Thank you for referring Amita Bhakta to me for evaluation. Attached you will find relevant portions of my assessment and plan of care.    If you have questions, please do not hesitate to call me. I look forward to following Amita Bhakta along with you.    Sincerely,    Uri Delgadillo MD    Enclosure  CC:  No Recipients    If you would like to receive this communication electronically, please contact [email or web address] or [phone] to request more information on [email or web address] Link access.    For providers and/or their staff who would like to refer a patient to [facility], please contact us through our one-stop-shop provider referral line, [facility], at [phone].    If you feel you have received this communication in error or would no longer like to receive these types of communications, please e-mail [email or web address]

## 2019-07-30 NOTE — PROGRESS NOTES
Patient presents follow-up after starting Zoloft due to anxiety symptoms after her mother passed away.  At this point she has noted significant improvement.  She would like to continue medication.  No complaint of side effects.  The viral syndrome that she had recently seems to have resolved, feels much better.  She did have laboratory evaluation which was negative except for some macrocytosis.  She has had some mild macrocytosis previously.  She denies significant alcohol use.  Her B12 and folate were normal.  She is pending an appointment with the hematologist.  She also has an appointment later today with Cardiology.  Apparently she had some irregularity noted on the heartbeat when she saw the nurse practitioner recently.  Appears EKG showed PVCs.  Patient asymptomatic.  She has tried to cut her caffeine intake back.      Amita was seen today for follow-up.    Diagnoses and all orders for this visit:    Anxiety    Macrocytosis    PVC (premature ventricular contraction)      Continue Zoloft.  Recommend at least 6 months treatment and then reassess.  She has appointment pending for Hematology regarding the macrocytosis.  Recommend avoid caffeine, exercise regularly regarding PVCs.          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    Depression     Gastric ulcer with hemorrhage     H pylori ulcer     History of blood transfusion     Migraine headache      Past Surgical History:   Procedure Laterality Date    BREAST BIOPSY      HAND TENDON SURGERY      HYSTERECTOMY      ovaries intact    TYMPANOSTOMY      TYMPANOSTOMY TUBE PLACEMENT      urethral dilation       Review of patient's allergies indicates:   Allergen Reactions    Penicillins      Other reaction(s): Unknown     Current Outpatient Medications on File Prior to Visit   Medication Sig Dispense Refill     acetaminophen (TYLENOL) 325 MG tablet Take 325 mg by mouth every 6 (six) hours as needed for Pain.      ALPRAZolam (XANAX) 0.25 MG tablet Take 1 tablet (0.25 mg total) by mouth 3 (three) times daily as needed for Anxiety. 15 tablet 0    CARAFATE 100 mg/mL suspension TAKE TWO TEASPOONSFUL (10 MLS) BY MOUTH FOUR TIMES DAILY  6    estradiol (ESTRACE) 2 MG tablet Take 2 mg by mouth once daily.      pantoprazole (PROTONIX) 40 MG tablet TAKE 1 TABLET BY MOUTH 30 MINUTES before BREAKFAST EVERY DAY  5    sertraline (ZOLOFT) 50 MG tablet Take 1/2 po daily for 1 week, then take 1 daily (Patient taking differently: Take 50 mg by mouth once daily. Take 1/2 po daily for 1 week, then take 1 daily) 30 tablet 5    prasterone, dhea, (INTRAROSA) 6.5 mg Inst Place 6.5 mg vaginally once daily.      [DISCONTINUED] butalbital-acetaminophen-caffeine -40 mg (FIORICET, ESGIC) -40 mg per tablet Take 1 tablet by mouth.       No current facility-administered medications on file prior to visit.      Social History     Socioeconomic History    Marital status:      Spouse name: Not on file    Number of children: Not on file    Years of education: Not on file    Highest education level: Not on file   Occupational History     Employer: [employer]   Social Needs    Financial resource strain: Not on file    Food insecurity:     Worry: Not on file     Inability: Not on file    Transportation needs:     Medical: Not on file     Non-medical: Not on file   Tobacco Use    Smoking status: Never Smoker   Substance and Sexual Activity    Alcohol use: Yes     Frequency: Monthly or less     Comment: occ 1x month    Drug use: No    Sexual activity: Yes     Partners: Male   Lifestyle    Physical activity:     Days per week: Not on file     Minutes per session: Not on file    Stress: Not on file   Relationships    Social connections:     Talks on phone: Not on file     Gets together: Not on file     Attends Congregational  "service: Not on file     Active member of club or organization: Not on file     Attends meetings of clubs or organizations: Not on file     Relationship status: Not on file   Other Topics Concern    Not on file   Social History Narrative    Not on file     Family History   Problem Relation Age of Onset    Heart disease Maternal Grandmother     Diabetes Maternal Grandmother     Hypertension Father     Hypertension Mother     Stroke Mother 72           ROS:  GENERAL: No significant weight changes.  No fever.   CARDIOVASCULAR: Denies chest pain, PND, orthopnea .  ABDOMEN: Denies diarrhea, abdominal pain, hematemesis or blood in stool.  URINARY: No flank pain, hematuria.    Vitals:    07/30/19 1259   BP: 123/76   Pulse: 66   Weight: 68.2 kg (150 lb 6.4 oz)   Height: 5' 5.5" (1.664 m)     Wt Readings from Last 3 Encounters:   07/30/19 68.2 kg (150 lb 6.4 oz)   07/18/19 68.9 kg (151 lb 12.8 oz)   07/12/19 69.8 kg (153 lb 14.1 oz)       OBJECTIVE:   APPEARANCE: Well nourished, well developed, in no acute distress.    HEAD: Normocephalic.  Atraumatic.    EYES:   Right eye: Pupil reactive.  Conjunctiva clear.    Left eye: Pupil reactive.  Conjunctiva clear.      NECK: Supple.  CHEST: Breath sounds clear bilaterally.  Normal respiratory effort  CARDIOVASCULAR: Normal rate.  Intermittent Premature beats noted No murmurs.  No rub.  No gallops.  ABDOMEN: Bowel sounds normal.  Soft.  No tenderness.  No organomegaly.  PERIPHERAL VASCULAR: No cyanosis.  No clubbing.  No edema.  NEUROLOGIC: No focal findings.  MENTAL STATUS: Alert.  Oriented x 3.        "

## 2019-08-07 ENCOUNTER — INITIAL CONSULT (OUTPATIENT)
Dept: HEMATOLOGY/ONCOLOGY | Facility: CLINIC | Age: 49
End: 2019-08-07
Payer: COMMERCIAL

## 2019-08-07 ENCOUNTER — LAB VISIT (OUTPATIENT)
Dept: LAB | Facility: HOSPITAL | Age: 49
End: 2019-08-07
Attending: INTERNAL MEDICINE
Payer: COMMERCIAL

## 2019-08-07 VITALS
HEART RATE: 73 BPM | HEIGHT: 66 IN | SYSTOLIC BLOOD PRESSURE: 92 MMHG | BODY MASS INDEX: 25.04 KG/M2 | OXYGEN SATURATION: 98 % | DIASTOLIC BLOOD PRESSURE: 70 MMHG | WEIGHT: 155.81 LBS

## 2019-08-07 DIAGNOSIS — D75.89 MACROCYTOSIS WITHOUT ANEMIA: Primary | ICD-10-CM

## 2019-08-07 DIAGNOSIS — D75.89 MACROCYTOSIS WITHOUT ANEMIA: ICD-10-CM

## 2019-08-07 LAB
BASOPHILS # BLD AUTO: 0.04 K/UL (ref 0–0.2)
BASOPHILS NFR BLD: 0.8 % (ref 0–1.9)
DIFFERENTIAL METHOD: ABNORMAL
EOSINOPHIL # BLD AUTO: 0.2 K/UL (ref 0–0.5)
EOSINOPHIL NFR BLD: 3.2 % (ref 0–8)
ERYTHROCYTE [DISTWIDTH] IN BLOOD BY AUTOMATED COUNT: 12.6 % (ref 11.5–14.5)
HAPTOGLOB SERPL-MCNC: 122 MG/DL (ref 30–250)
HCT VFR BLD AUTO: 40.2 % (ref 37–48.5)
HGB BLD-MCNC: 13.5 G/DL (ref 12–16)
LDH SERPL L TO P-CCNC: 154 U/L (ref 110–260)
LYMPHOCYTES # BLD AUTO: 1.7 K/UL (ref 1–4.8)
LYMPHOCYTES NFR BLD: 32.3 % (ref 18–48)
MCH RBC QN AUTO: 33 PG (ref 27–31)
MCHC RBC AUTO-ENTMCNC: 33.6 G/DL (ref 32–36)
MCV RBC AUTO: 98 FL (ref 82–98)
MONOCYTES # BLD AUTO: 0.4 K/UL (ref 0.3–1)
MONOCYTES NFR BLD: 7.1 % (ref 4–15)
NEUTROPHILS # BLD AUTO: 3 K/UL (ref 1.8–7.7)
NEUTROPHILS NFR BLD: 56.6 % (ref 38–73)
PLATELET # BLD AUTO: 211 K/UL (ref 150–350)
PMV BLD AUTO: 10.3 FL (ref 9.2–12.9)
RBC # BLD AUTO: 4.09 M/UL (ref 4–5.4)
RETICS/RBC NFR AUTO: 1.7 % (ref 0.5–2.5)
WBC # BLD AUTO: 5.24 K/UL (ref 3.9–12.7)

## 2019-08-07 PROCEDURE — 86334 IMMUNOFIX E-PHORESIS SERUM: CPT | Mod: 26,,, | Performed by: PATHOLOGY

## 2019-08-07 PROCEDURE — 85060 BLOOD SMEAR INTERPRETATION: CPT | Mod: ,,, | Performed by: PATHOLOGY

## 2019-08-07 PROCEDURE — 84165 PROTEIN E-PHORESIS SERUM: CPT

## 2019-08-07 PROCEDURE — 85060 PATHOLOGIST REVIEW: ICD-10-PCS | Mod: ,,, | Performed by: PATHOLOGY

## 2019-08-07 PROCEDURE — 85025 COMPLETE CBC W/AUTO DIFF WBC: CPT | Mod: PO

## 2019-08-07 PROCEDURE — 86334 PATHOLOGIST INTERPRETATION IFE: ICD-10-PCS | Mod: 26,,, | Performed by: PATHOLOGY

## 2019-08-07 PROCEDURE — 83010 ASSAY OF HAPTOGLOBIN QUANT: CPT

## 2019-08-07 PROCEDURE — 99244 OFF/OP CNSLTJ NEW/EST MOD 40: CPT | Mod: S$GLB,,, | Performed by: INTERNAL MEDICINE

## 2019-08-07 PROCEDURE — 36415 COLL VENOUS BLD VENIPUNCTURE: CPT | Mod: PO

## 2019-08-07 PROCEDURE — 99999 PR PBB SHADOW E&M-EST. PATIENT-LVL III: CPT | Mod: PBBFAC,,, | Performed by: INTERNAL MEDICINE

## 2019-08-07 PROCEDURE — 99244 PR OFFICE CONSULTATION,LEVEL IV: ICD-10-PCS | Mod: S$GLB,,, | Performed by: INTERNAL MEDICINE

## 2019-08-07 PROCEDURE — 86334 IMMUNOFIX E-PHORESIS SERUM: CPT

## 2019-08-07 PROCEDURE — 99999 PR PBB SHADOW E&M-EST. PATIENT-LVL III: ICD-10-PCS | Mod: PBBFAC,,, | Performed by: INTERNAL MEDICINE

## 2019-08-07 PROCEDURE — 84165 PROTEIN E-PHORESIS SERUM: CPT | Mod: 26,,, | Performed by: PATHOLOGY

## 2019-08-07 PROCEDURE — 85045 AUTOMATED RETICULOCYTE COUNT: CPT

## 2019-08-07 PROCEDURE — 83520 IMMUNOASSAY QUANT NOS NONAB: CPT | Mod: 59

## 2019-08-07 PROCEDURE — 83615 LACTATE (LD) (LDH) ENZYME: CPT

## 2019-08-07 PROCEDURE — 84165 PATHOLOGIST INTERPRETATION SPE: ICD-10-PCS | Mod: 26,,, | Performed by: PATHOLOGY

## 2019-08-07 PROCEDURE — 82668 ASSAY OF ERYTHROPOIETIN: CPT

## 2019-08-07 NOTE — PROGRESS NOTES
Subjective:      Patient ID: Amita Bhakta is a 48 y.o. female.    Chief Complaint: Advice Only    The patient is a 48-year-old  female who presents to the Hematology Oncology Clinic today to discuss further evaluation management recommendations for chronic macrocytosis.  The patient has been referred to me by Dr. Beltran Velasco with Internal Medicine.  I have reviewed all the patient's relevant clinical history available in the medical record and have utilized this in my evaluation and management recommendations today.  Today the patient reports that overall she feels well and has no specific complaints.  She denies any fevers, chills or night sweats.  She denies any loss of appetite or unintentional weight loss. She denies any chest pain or shortness of breath.  She denies any nausea, vomiting or abdominal pain.  She denies any bowel or urinary complaints.  She denies any melena, hematochezia, hematemesis, hemoptysis or hematuria.  She denies any significant alcohol intake (reports that she drinks 1-2 alcoholic beverages a month).    Review of Systems   Constitutional: Negative for activity change, appetite change, chills, diaphoresis, fatigue, fever and unexpected weight change.   HENT: Negative for congestion, dental problem, ear discharge, ear pain, hearing loss, mouth sores, postnasal drip, sinus pressure, sore throat, tinnitus, trouble swallowing and voice change.    Eyes: Negative for photophobia, pain and visual disturbance.   Respiratory: Negative for cough, chest tightness, shortness of breath and wheezing.    Cardiovascular: Negative for chest pain, palpitations and leg swelling.   Gastrointestinal: Negative for abdominal distention, abdominal pain, blood in stool, constipation, diarrhea, nausea and vomiting.   Endocrine: Negative for cold intolerance, heat intolerance, polydipsia, polyphagia and polyuria.   Genitourinary: Negative for difficulty urinating, dysuria, flank pain,  frequency, hematuria, urgency, vaginal bleeding and vaginal discharge.   Musculoskeletal: Negative for arthralgias, back pain, gait problem, joint swelling and myalgias.   Skin: Negative for pallor and rash.   Allergic/Immunologic: Negative for immunocompromised state.   Neurological: Negative for dizziness, syncope, weakness, light-headedness, numbness and headaches.   Hematological: Negative for adenopathy. Does not bruise/bleed easily.   Psychiatric/Behavioral: Negative for agitation, confusion and dysphoric mood. The patient is not nervous/anxious.        Medication List with Changes/Refills   Current Medications    ACETAMINOPHEN (TYLENOL) 325 MG TABLET    Take 325 mg by mouth every 6 (six) hours as needed for Pain.    ALPRAZOLAM (XANAX) 0.25 MG TABLET    Take 1 tablet (0.25 mg total) by mouth 3 (three) times daily as needed for Anxiety.    CARAFATE 100 MG/ML SUSPENSION    TAKE TWO TEASPOONSFUL (10 MLS) BY MOUTH FOUR TIMES DAILY    ESTRADIOL (ESTRACE) 2 MG TABLET    Take 2 mg by mouth once daily.    PANTOPRAZOLE (PROTONIX) 40 MG TABLET    TAKE 1 TABLET BY MOUTH 30 MINUTES before BREAKFAST EVERY DAY    PRASTERONE, DHEA, (INTRAROSA) 6.5 MG INST    Place 6.5 mg vaginally once daily.    SERTRALINE (ZOLOFT) 50 MG TABLET    Take 1/2 po daily for 1 week, then take 1 daily     Review of patient's allergies indicates:   Allergen Reactions    Penicillins      Other reaction(s): Unknown       Lab: I have reviewed all of the patient's relevant lab work available in the medical record and have utilized this in my evaluation and management recommendations today    Imaging: I have reviewed all of the patient's diagnostic/imaging results available in the medical record and have utilized this in my evaluation and management recommendations today.      Objective:     Vitals:    08/07/19 0828   BP: 92/70   Pulse: 73       Physical Exam   Constitutional: She is oriented to person, place, and time. She appears well-developed and  well-nourished. She is active and cooperative.   HENT:   Head: Normocephalic and atraumatic.   Nose: Nose normal.   Mouth/Throat: Oropharynx is clear and moist. No oropharyngeal exudate.   Eyes: Pupils are equal, round, and reactive to light. No scleral icterus.   Neck: Neck supple. No thyromegaly present.   Cardiovascular: Normal rate, regular rhythm, normal heart sounds and intact distal pulses.   No murmur heard.  Pulmonary/Chest: Effort normal and breath sounds normal. No stridor. No respiratory distress. She has no wheezes. She has no rales.   Abdominal: Soft. Bowel sounds are normal. She exhibits no distension, no ascites and no mass. There is no hepatosplenomegaly. There is no tenderness. There is no rigidity, no rebound and no guarding.   Musculoskeletal: She exhibits no edema or tenderness.   Lymphadenopathy:     She has no cervical adenopathy.   Neurological: She is alert and oriented to person, place, and time. No cranial nerve deficit. She exhibits normal muscle tone. Coordination normal.   Skin: Skin is warm and dry. No rash noted. No pallor.   Psychiatric: She has a normal mood and affect. Her behavior is normal. Judgment and thought content normal.   Nursing note and vitals reviewed.      Assessment:     1. Macrocytosis without anemia        Plan:   1.  I had a detailed discussion with the patient today with regard to the differential diagnosis for her chronic macrocytosis.  Her WBC count and platelet count are normal. Review of the medical record shows that she has had mild macrocytosis for several years.  She has been clinically asymptomatic.  2.  I will check lab work today for further evaluation of her chronic macrocytosis.  We will review her peripheral blood smear for any significant abnormalities.  If all results are unremarkable we will continue with observation.    Further evaluation/management/follow up recommendations will be determined after review of above results.  She knows to call for  any additional questions or new problems.  Macrocytosis without anemia  -     CBC auto differential; Future; Expected date: 08/07/2019  -     Pathologist Interpretation Differential; Future; Expected date: 08/07/2019  -     Protein electrophoresis, serum; Future; Expected date: 08/07/2019  -     Immunoglobulin free LT chains blood; Future; Expected date: 08/07/2019  -     IMMUNOFIXATION ELECTROPHORESIS, SERUM; Future; Expected date: 08/07/2019  -     Lactate dehydrogenase; Future; Expected date: 08/07/2019  -     HAPTOGLOBIN; Future; Expected date: 08/07/2019  -     Reticulocytes; Future; Expected date: 08/07/2019  -     Erythropoietin; Future; Expected date: 08/07/2019

## 2019-08-07 NOTE — LETTER
August 7, 2019      Beltran Velasco MD  [address] [facility]  [place] [address]           [place] - Hematology Oncology  [address] [facility] [address]  Phone: [phone]          Patient: Amita Bhakta   MR Number: [id number]   YOB: 1970   Date of Visit: 8/7/2019       Dear Dr. Beltran Velasco:    Thank you for referring Amita Bhakta to me for evaluation. Attached you will find relevant portions of my assessment and plan of care.    If you have questions, please do not hesitate to call me. I look forward to following Amita Bhakta along with you.    Sincerely,    Dereck Barron MD    Enclosure  CC:  No Recipients    If you would like to receive this communication electronically, please contact [email or web address] or [phone] to request more information on [employer]? Link access.    For providers and/or their staff who would like to refer a patient to [facility], please contact us through our one-stop-shop provider referral line, [facility], at [phone].    If you feel you have received this communication in error or would no longer like to receive these types of communications, please e-mail [email or web address]

## 2019-08-08 LAB
ALBUMIN SERPL ELPH-MCNC: 4.26 G/DL (ref 3.35–5.55)
ALPHA1 GLOB SERPL ELPH-MCNC: 0.35 G/DL (ref 0.17–0.41)
ALPHA2 GLOB SERPL ELPH-MCNC: 0.76 G/DL (ref 0.43–0.99)
B-GLOBULIN SERPL ELPH-MCNC: 0.8 G/DL (ref 0.5–1.1)
GAMMA GLOB SERPL ELPH-MCNC: 1.52 G/DL (ref 0.67–1.58)
INTERPRETATION SERPL IFE-IMP: NORMAL
KAPPA LC SER QL IA: 2.23 MG/DL (ref 0.33–1.94)
KAPPA LC/LAMBDA SER IA: 1.3 (ref 0.26–1.65)
LAMBDA LC SER QL IA: 1.72 MG/DL (ref 0.57–2.63)
PATH REV BLD -IMP: NORMAL
PATH REV BLD -IMP: NORMAL
PATHOLOGIST INTERPRETATION IFE: NORMAL
PATHOLOGIST INTERPRETATION SPE: NORMAL
PROT SERPL-MCNC: 7.7 G/DL (ref 6–8.4)

## 2019-08-09 ENCOUNTER — PATIENT MESSAGE (OUTPATIENT)
Dept: HEMATOLOGY/ONCOLOGY | Facility: CLINIC | Age: 49
End: 2019-08-09

## 2019-08-12 LAB — EPO SERPL-ACNC: 26.1 MIU/ML (ref 2.6–18.5)

## 2019-08-13 ENCOUNTER — PATIENT MESSAGE (OUTPATIENT)
Dept: HEMATOLOGY/ONCOLOGY | Facility: CLINIC | Age: 49
End: 2019-08-13

## 2019-08-14 DIAGNOSIS — D75.89 MACROCYTOSIS WITHOUT ANEMIA: Primary | ICD-10-CM

## 2019-09-09 ENCOUNTER — TELEPHONE (OUTPATIENT)
Dept: FAMILY MEDICINE | Facility: CLINIC | Age: 49
End: 2019-09-09

## 2019-09-09 DIAGNOSIS — Z12.39 SCREENING FOR BREAST CANCER: Primary | ICD-10-CM

## 2019-09-09 NOTE — TELEPHONE ENCOUNTER
----- Message from Promise Gracia sent at 9/9/2019 12:13 PM CDT -----  Contact: pt  She's calling in regards to orders for mammo,    pls call pt back at [phone] (home)   ]

## 2019-09-09 NOTE — TELEPHONE ENCOUNTER
----- Message from Promise Gracia sent at 9/9/2019 12:13 PM CDT -----  Contact: pt  She's calling in regards to orders for mammo,    pls call pt back at [phone] (home)   ]

## 2019-09-10 ENCOUNTER — HOSPITAL ENCOUNTER (OUTPATIENT)
Dept: RADIOLOGY | Facility: HOSPITAL | Age: 49
Discharge: HOME | End: 2019-09-10
Attending: FAMILY MEDICINE
Payer: COMMERCIAL

## 2019-09-10 VITALS — HEIGHT: 66 IN | BODY MASS INDEX: 24.91 KG/M2 | WEIGHT: 155 LBS

## 2019-09-10 DIAGNOSIS — Z12.39 SCREENING FOR BREAST CANCER: ICD-10-CM

## 2019-09-10 PROCEDURE — 77063 MAMMO DIGITAL SCREENING BILAT WITH TOMOSYNTHESIS_CAD: ICD-10-PCS | Mod: 26,,, | Performed by: RADIOLOGY

## 2019-09-10 PROCEDURE — 77067 SCR MAMMO BI INCL CAD: CPT | Mod: TC,PO

## 2019-09-10 PROCEDURE — 77063 BREAST TOMOSYNTHESIS BI: CPT | Mod: 26,,, | Performed by: RADIOLOGY

## 2019-09-10 PROCEDURE — 77067 MAMMO DIGITAL SCREENING BILAT WITH TOMOSYNTHESIS_CAD: ICD-10-PCS | Mod: 26,,, | Performed by: RADIOLOGY

## 2019-09-10 PROCEDURE — 77067 SCR MAMMO BI INCL CAD: CPT | Mod: 26,,, | Performed by: RADIOLOGY

## 2019-12-19 ENCOUNTER — TELEPHONE (OUTPATIENT)
Dept: FAMILY MEDICINE | Facility: CLINIC | Age: 49
End: 2019-12-19

## 2019-12-19 NOTE — TELEPHONE ENCOUNTER
Left message on voice mail to return call, no available appts, may use urgent care/walk in, or ER for distress

## 2020-01-15 RX ORDER — SERTRALINE HYDROCHLORIDE 50 MG/1
50 TABLET, FILM COATED ORAL DAILY
Qty: 30 TABLET | Refills: 5 | Status: CANCELLED | OUTPATIENT
Start: 2020-01-15

## 2020-01-15 RX ORDER — SERTRALINE HYDROCHLORIDE 50 MG/1
50 TABLET, FILM COATED ORAL DAILY
Qty: 30 TABLET | Refills: 5 | Status: SHIPPED | OUTPATIENT
Start: 2020-01-15 | End: 2020-05-05 | Stop reason: ALTCHOICE

## 2020-01-15 NOTE — TELEPHONE ENCOUNTER
----- Message from Columba Veras sent at 1/15/2020  1:32 PM CST -----  Contact: Pt   Type:  RX Refill Request    Who Called: Amita Bhakta  Refill or New Rx:Refill  RX Name and Strength:sertraline (ZOLOFT) 50 MG tablet  How is the patient currently taking it? (ex. 1XDay):1x  Is this a 30 day or 90 day RX:30  Preferred Pharmacy with phone number:  [facility] [place], [place] - [address] [facility] [address]  Phone: [phone] Fax: [phone]  Local or Mail Order:Local   Ordering Provider:Dr. Velasco  Would the patient rather a call back or a response via [facility]? Call Back  Best Call Back Number:[phone]  Additional Information:

## 2020-01-15 NOTE — TELEPHONE ENCOUNTER
On Zoloft, July note stated to reassess in 6 months, patient is asking for refills, does she need appt, she reports she is doing fine on the medication and feels better, please advise if visit is needed for refills.

## 2020-05-04 ENCOUNTER — PATIENT MESSAGE (OUTPATIENT)
Dept: FAMILY MEDICINE | Facility: CLINIC | Age: 50
End: 2020-05-04

## 2020-05-05 ENCOUNTER — OFFICE VISIT (OUTPATIENT)
Dept: FAMILY MEDICINE | Facility: CLINIC | Age: 50
End: 2020-05-05
Payer: COMMERCIAL

## 2020-05-05 DIAGNOSIS — K21.9 GASTROESOPHAGEAL REFLUX DISEASE, ESOPHAGITIS PRESENCE NOT SPECIFIED: ICD-10-CM

## 2020-05-05 DIAGNOSIS — F41.9 ANXIETY: Primary | ICD-10-CM

## 2020-05-05 PROCEDURE — 99213 OFFICE O/P EST LOW 20 MIN: CPT | Mod: 95,,, | Performed by: FAMILY MEDICINE

## 2020-05-05 PROCEDURE — 99213 PR OFFICE/OUTPT VISIT, EST, LEVL III, 20-29 MIN: ICD-10-PCS | Mod: 95,,, | Performed by: FAMILY MEDICINE

## 2020-05-05 RX ORDER — BUPROPION HYDROCHLORIDE 150 MG/1
150 TABLET ORAL DAILY
Qty: 30 TABLET | Refills: 1 | Status: SHIPPED | OUTPATIENT
Start: 2020-05-05 | End: 2020-06-24 | Stop reason: SDUPTHER

## 2020-05-05 RX ORDER — PANTOPRAZOLE SODIUM 40 MG/1
40 TABLET, DELAYED RELEASE ORAL DAILY
Qty: 30 TABLET | Refills: 5 | Status: SHIPPED | OUTPATIENT
Start: 2020-05-05 | End: 2021-04-28

## 2020-05-05 NOTE — PROGRESS NOTES
Primary Care Telemedicine Note    The patient location is:  [place]  The chief complaint leading to consultation is: per below note  Total time spent with patient:  16 min      Visit type: Virtual visit with synchronous audio only and video  Each patient to whom he or she provides medical services by telemedicine is:  (1) informed of the relationship between the physician and patient and the respective role of any other health care provider with respect to management of the patient; and (2) notified that he or she may decline to receive medical services by telemedicine and may withdraw from such care at any time.    Patient want to change Zoloft to something else for anxiety because she states she has gained 15 lb with the medication.  Does appear to work well for her with regards to anxiety.  She denies any significant depression.  No SI/HI.  She did take Wellbutrin remotely in the past and felt like she did okay with this.  She also wanted to get a refill on Protonix for reflux.    Diagnoses and all orders for this visit:    Anxiety    Gastroesophageal reflux disease, esophagitis presence not specified    Other orders  -     buPROPion (WELLBUTRIN XL) 150 MG TB24 tablet; Take 1 tablet (150 mg total) by mouth once daily.  -     pantoprazole (PROTONIX) 40 MG tablet; Take 1 tablet (40 mg total) by mouth once daily.       Discontinue Zoloft.  Follow-up 1 month.         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    Depression     Gastric ulcer with hemorrhage     H pylori ulcer     History of blood transfusion     Migraine headache      Past Surgical History:   Procedure Laterality Date    BREAST BIOPSY      HAND TENDON SURGERY      HYSTERECTOMY      ovaries intact    TYMPANOSTOMY      TYMPANOSTOMY TUBE PLACEMENT      urethral dilation       Social History     Socioeconomic  History    Marital status:      Spouse name: Not on file    Number of children: Not on file    Years of education: Not on file    Highest education level: Not on file   Occupational History     Employer: [employer]   Social Needs    Financial resource strain: Not on file    Food insecurity:     Worry: Not on file     Inability: Not on file    Transportation needs:     Medical: Not on file     Non-medical: Not on file   Tobacco Use    Smoking status: Never Smoker   Substance and Sexual Activity    Alcohol use: Yes     Frequency: Monthly or less     Comment: occ 1x month    Drug use: No    Sexual activity: Yes     Partners: Male   Lifestyle    Physical activity:     Days per week: Not on file     Minutes per session: Not on file    Stress: Not on file   Relationships    Social connections:     Talks on phone: Not on file     Gets together: Not on file     Attends Buddhist service: Not on file     Active member of club or organization: Not on file     Attends meetings of clubs or organizations: Not on file     Relationship status: Not on file   Other Topics Concern    Not on file   Social History Narrative    Not on file     Family History   Problem Relation Age of Onset    Heart disease Maternal Grandmother     Diabetes Maternal Grandmother     Hypertension Father     Hypertension Mother     Stroke Mother 72     Review of patient's allergies indicates:   Allergen Reactions    Penicillins      Other reaction(s): Unknown     Current Outpatient Medications on File Prior to Visit   Medication Sig Dispense Refill    acetaminophen (TYLENOL) 325 MG tablet Take 325 mg by mouth every 6 (six) hours as needed for Pain.      ALPRAZolam (XANAX) 0.25 MG tablet Take 1 tablet (0.25 mg total) by mouth 3 (three) times daily as needed for Anxiety. 15 tablet 0    CARAFATE 100 mg/mL suspension TAKE TWO TEASPOONSFUL (10 MLS) BY MOUTH FOUR TIMES DAILY  6    estradiol (ESTRACE) 2 MG tablet Take 2 mg by  mouth once daily.      prasterone, dhea, (INTRAROSA) 6.5 mg Inst Place 6.5 mg vaginally once daily.      [DISCONTINUED] pantoprazole (PROTONIX) 40 MG tablet TAKE 1 TABLET BY MOUTH 30 MINUTES before BREAKFAST EVERY DAY  5    [DISCONTINUED] sertraline (ZOLOFT) 50 MG tablet Take 1 tablet (50 mg total) by mouth once daily. 30 tablet 5     No current facility-administered medications on file prior to visit.        Answers for HPI/ROS submitted by the patient on 5/5/2020   activity change: No  unexpected weight change: Yes  neck pain: No  hearing loss: No  rhinorrhea: No  trouble swallowing: No  eye discharge: No  visual disturbance: No  chest tightness: No  wheezing: No  chest pain: No  palpitations: No  blood in stool: No  constipation: No  vomiting: No  diarrhea: No  polydipsia: No  polyuria: No  difficulty urinating: No  hematuria: No  menstrual problem: No  dysuria: No  joint swelling: No  arthralgias: No  headaches: Yes  weakness: No  confusion: No  dysphoric mood: No    There were no vitals filed for this visit.    Wt Readings from Last 3 Encounters:   09/10/19 70.3 kg (155 lb)   08/07/19 70.7 kg (155 lb 12.8 oz)   07/30/19 68.1 kg (150 lb 0.4 oz)       APPEARANCE: Well nourished, well developed, in no acute distress.    MENTAL STATUS: Alert.  Oriented x 3.  Head atraumatic normocephalic no obvious sinus swelling  Eyes extraocular movements intact.  No obvious conjunctivitis  Neck supple  Chest no respiratory distress noted.  No accessory muscle use.

## 2020-06-24 ENCOUNTER — TELEPHONE (OUTPATIENT)
Dept: FAMILY MEDICINE | Facility: CLINIC | Age: 50
End: 2020-06-24

## 2020-06-24 ENCOUNTER — OFFICE VISIT (OUTPATIENT)
Dept: FAMILY MEDICINE | Facility: CLINIC | Age: 50
End: 2020-06-24
Payer: COMMERCIAL

## 2020-06-24 DIAGNOSIS — F41.9 ANXIETY: Primary | ICD-10-CM

## 2020-06-24 PROCEDURE — 99213 PR OFFICE/OUTPT VISIT, EST, LEVL III, 20-29 MIN: ICD-10-PCS | Mod: 95,,, | Performed by: FAMILY MEDICINE

## 2020-06-24 PROCEDURE — 99213 OFFICE O/P EST LOW 20 MIN: CPT | Mod: 95,,, | Performed by: FAMILY MEDICINE

## 2020-06-24 RX ORDER — BUPROPION HYDROCHLORIDE 150 MG/1
150 TABLET ORAL DAILY
Qty: 90 TABLET | Refills: 1 | Status: SHIPPED | OUTPATIENT
Start: 2020-06-24 | End: 2021-04-28

## 2020-06-24 NOTE — PROGRESS NOTES
Primary Care Telemedicine Note    The patient location is:  [place]  The chief complaint leading to consultation is: per below note  Total time spent with patient:  13 min      Visit type: Virtual visit with synchronous audio only and video-Doximity, video failed completed audio.  Each patient to whom he or she provides medical services by telemedicine is:  (1) informed of the relationship between the physician and patient and the respective role of any other health care provider with respect to management of the patient; and (2) notified that he or she may decline to receive medical services by telemedicine and may withdraw from such care at any time.    We started Wellbutrin last visit in place of Zoloft due to perceived weight gain.  She feels like she is doing well with Wellbutrin and feels like she is losing weight.  She has not actually been able to weigh herself due to problems with her scale at home.    Does appear to work well for her with regards to anxiety.  She denies any significant depression.  No SI/HI.  She did take Wellbutrin remotely in the past and felt like she did okay with this.      Diagnoses and all orders for this visit:    Anxiety    Other orders  -     buPROPion (WELLBUTRIN XL) 150 MG TB24 tablet; Take 1 tablet (150 mg total) by mouth once daily.       Continue current medication.  Follow-up 6 months and as needed prior.         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    Depression     Gastric ulcer with hemorrhage     H pylori ulcer     History of blood transfusion     Migraine headache      Past Surgical History:   Procedure Laterality Date    BREAST BIOPSY      HAND TENDON SURGERY      HYSTERECTOMY      ovaries intact    TYMPANOSTOMY      TYMPANOSTOMY TUBE PLACEMENT      urethral dilation       Social History     Socioeconomic History     Marital status:      Spouse name: Not on file    Number of children: Not on file    Years of education: Not on file    Highest education level: Not on file   Occupational History     Employer: [employer]   Social Needs    Financial resource strain: Not on file    Food insecurity     Worry: Not on file     Inability: Not on file    Transportation needs     Medical: Not on file     Non-medical: Not on file   Tobacco Use    Smoking status: Never Smoker   Substance and Sexual Activity    Alcohol use: Yes     Frequency: Monthly or less     Comment: occ 1x month    Drug use: No    Sexual activity: Yes     Partners: Male   Lifestyle    Physical activity     Days per week: Not on file     Minutes per session: Not on file    Stress: Not on file   Relationships    Social connections     Talks on phone: Not on file     Gets together: Not on file     Attends Episcopal service: Not on file     Active member of club or organization: Not on file     Attends meetings of clubs or organizations: Not on file     Relationship status: Not on file   Other Topics Concern    Not on file   Social History Narrative    Not on file     Family History   Problem Relation Age of Onset    Heart disease Maternal Grandmother     Diabetes Maternal Grandmother     Hypertension Father     Hypertension Mother     Stroke Mother 72     Review of patient's allergies indicates:   Allergen Reactions    Penicillins      Other reaction(s): Unknown     Current Outpatient Medications on File Prior to Visit   Medication Sig Dispense Refill    acetaminophen (TYLENOL) 325 MG tablet Take 325 mg by mouth every 6 (six) hours as needed for Pain.      ALPRAZolam (XANAX) 0.25 MG tablet Take 1 tablet (0.25 mg total) by mouth 3 (three) times daily as needed for Anxiety. 15 tablet 0    CARAFATE 100 mg/mL suspension TAKE TWO TEASPOONSFUL (10 MLS) BY MOUTH FOUR TIMES DAILY  6    estradiol (ESTRACE) 2 MG tablet Take 2 mg by mouth once  daily.      pantoprazole (PROTONIX) 40 MG tablet Take 1 tablet (40 mg total) by mouth once daily. 30 tablet 5    prasterone, dhea, (INTRAROSA) 6.5 mg Inst Place 6.5 mg vaginally once daily.      [DISCONTINUED] buPROPion (WELLBUTRIN XL) 150 MG TB24 tablet Take 1 tablet (150 mg total) by mouth once daily. 30 tablet 1     No current facility-administered medications on file prior to visit.        There were no vitals filed for this visit.    Wt Readings from Last 3 Encounters:   09/10/19 70.3 kg (155 lb)   08/07/19 70.7 kg (155 lb 12.8 oz)   07/30/19 68.1 kg (150 lb 0.4 oz)         MENTAL STATUS: Alert.  Oriented x 3.  Normal thought processes.  Normal affect.

## 2020-06-24 NOTE — TELEPHONE ENCOUNTER
----- Message from Macarena Carpenter sent at 6/24/2020  8:15 AM CDT -----  Regarding: Refill  Type:  RX Refill Request    Who Called: Pt   Refill or New Rx: refill   RX Name and Strength: buPROPion (WELLBUTRIN XL) 150 MG TB24 tablet  How is the patient currently taking it? (ex. 1XDay): once daily daily   Is this a 30 day or 90 day RX: 90 day supply   Preferred Pharmacy with phone number:   [facility] [place] - [address] [address]  Phone: [phone] Fax: [phone]  Local or Mail Order:local   Ordering Provider: quoc   Would the patient rather a call back or a response via [facility]? Call back   Best Call Back Number: [phone] (home) or [phone]  Additional Information: n/a

## 2020-06-24 NOTE — TELEPHONE ENCOUNTER
Patient informed at 5/5 visit, Dr Velasco noted 1 month follow up , [facility], patient aware

## 2020-09-21 ENCOUNTER — TELEPHONE (OUTPATIENT)
Dept: FAMILY MEDICINE | Facility: CLINIC | Age: 50
End: 2020-09-21

## 2020-09-21 DIAGNOSIS — Z12.31 ENCOUNTER FOR SCREENING MAMMOGRAM FOR BREAST CANCER: Primary | ICD-10-CM

## 2020-09-21 NOTE — TELEPHONE ENCOUNTER
----- Message from Natalya Huitron sent at 9/21/2020  7:45 AM CDT -----  Regarding: Mammo orders  Contact: Patient  .Type:  Mammogram    Caller is requesting to schedule their annual mammogram appointment.  Order is not listed in EPIC.  Please enter order and contact patient to schedule.  Name of Caller:Patient  Where would they like the mammogram performed? [facility]  Would the patient rather a call back or a response via [facility]? Call  Best Call Back Number: .[phone]  Additional Information:

## 2020-09-25 ENCOUNTER — TELEPHONE (OUTPATIENT)
Dept: ADMINISTRATIVE | Facility: HOSPITAL | Age: 50
End: 2020-09-25

## 2020-09-28 ENCOUNTER — HOSPITAL ENCOUNTER (OUTPATIENT)
Dept: RADIOLOGY | Facility: HOSPITAL | Age: 50
Discharge: HOME | End: 2020-09-28
Attending: FAMILY MEDICINE
Payer: COMMERCIAL

## 2020-09-28 VITALS — HEIGHT: 66 IN | BODY MASS INDEX: 24.91 KG/M2 | WEIGHT: 155 LBS

## 2020-09-28 DIAGNOSIS — Z12.31 ENCOUNTER FOR SCREENING MAMMOGRAM FOR BREAST CANCER: ICD-10-CM

## 2020-09-28 PROCEDURE — 77063 BREAST TOMOSYNTHESIS BI: CPT | Mod: 26,,, | Performed by: RADIOLOGY

## 2020-09-28 PROCEDURE — 77063 MAMMO DIGITAL SCREENING BILAT WITH TOMO: ICD-10-PCS | Mod: 26,,, | Performed by: RADIOLOGY

## 2020-09-28 PROCEDURE — 77067 MAMMO DIGITAL SCREENING BILAT WITH TOMO: ICD-10-PCS | Mod: 26,,, | Performed by: RADIOLOGY

## 2020-09-28 PROCEDURE — 77067 SCR MAMMO BI INCL CAD: CPT | Mod: TC,PO

## 2020-09-28 PROCEDURE — 77067 SCR MAMMO BI INCL CAD: CPT | Mod: 26,,, | Performed by: RADIOLOGY

## 2020-10-08 ENCOUNTER — TELEPHONE (OUTPATIENT)
Dept: FAMILY MEDICINE | Facility: CLINIC | Age: 50
End: 2020-10-08

## 2020-10-08 ENCOUNTER — OFFICE VISIT (OUTPATIENT)
Dept: FAMILY MEDICINE | Facility: CLINIC | Age: 50
End: 2020-10-08
Payer: COMMERCIAL

## 2020-10-08 DIAGNOSIS — J06.9 UPPER RESPIRATORY TRACT INFECTION, UNSPECIFIED TYPE: Primary | ICD-10-CM

## 2020-10-08 PROCEDURE — 99213 OFFICE O/P EST LOW 20 MIN: CPT | Mod: 95,,, | Performed by: FAMILY MEDICINE

## 2020-10-08 PROCEDURE — 99213 PR OFFICE/OUTPT VISIT, EST, LEVL III, 20-29 MIN: ICD-10-PCS | Mod: 95,,, | Performed by: FAMILY MEDICINE

## 2020-10-08 RX ORDER — FLUTICASONE PROPIONATE 50 MCG
SPRAY, SUSPENSION (ML) NASAL
Qty: 16 G | Refills: 1 | Status: SHIPPED | OUTPATIENT
Start: 2020-10-08 | End: 2021-04-28

## 2020-10-08 RX ORDER — LORATADINE 10 MG/1
10 TABLET ORAL DAILY
Qty: 30 TABLET | COMMUNITY
Start: 2020-10-08 | End: 2021-04-28

## 2020-10-08 NOTE — LETTER
October 8, 2020           [facility]  [address] [place] [address]  Phone: [phone]  Fax: [phone] October 8, 2020     Patient: Amita Bhakta    YOB: 1970   Date of Visit: 10/8/2020       To Whom It May Concern:    It is my medical opinion that Amita Bhakta  is unable to work today October 8, 2020 due to medical issue.   If you have any questions or concerns, please don't hesitate to call.    Sincerely,          Beltran Velasco MD

## 2020-10-08 NOTE — TELEPHONE ENCOUNTER
----- Message from Sylvia Olson sent at 10/8/2020  8:21 AM CDT -----  Regarding: patient states she have a sinus headeache staying home from today. she is requesting  a return to work note  Contact: patient  Excuse her for today. Please call patient @ [phone]. thanks

## 2020-10-08 NOTE — PROGRESS NOTES
Primary Care Telemedicine Note    The patient location is:  [place]  The chief complaint leading to consultation is: per below note  Total time spent with patient:  10 min      Visit type: Virtual visit with synchronous audio and video  Each patient to whom he or she provides medical services by telemedicine is:  (1) informed of the relationship between the physician and patient and the respective role of any other health care provider with respect to management of the patient; and (2) notified that he or she may decline to receive medical services by telemedicine and may withdraw from such care at any time.    Patient complains sinus congestion sinus headache postnasal drainage occasional cough your pressure slight nausea.  Symptoms started last night.  She denies any fever chills body aches diarrhea or anosmia.  Current treatment Tylenol sinus with improvement.  States she has had allergy issues previously.  Denies sick contacts.  Working from home.    Diagnoses and all orders for this visit:    Upper respiratory tract infection, unspecified type    Other orders  -     loratadine (CLARITIN) 10 mg tablet; Take 1 tablet (10 mg total) by mouth once daily.  -     fluticasone propionate (FLONASE) 50 mcg/actuation nasal spray; Use 2 sprays to each nostril daily       She may continue Tylenol sinus.  Discussed COVID-19 testing today, patient declines but will consider if she is not improving in the next few days.  Follow-up if symptoms not improving.         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    Depression     Gastric ulcer with hemorrhage     H pylori ulcer     History of blood transfusion     Migraine headache      Past Surgical History:   Procedure Laterality Date    BREAST BIOPSY      HAND TENDON SURGERY      HYSTERECTOMY      ovaries intact    TYMPANOSTOMY       TYMPANOSTOMY TUBE PLACEMENT      urethral dilation       Social History     Socioeconomic History    Marital status:      Spouse name: Not on file    Number of children: Not on file    Years of education: Not on file    Highest education level: Not on file   Occupational History     Employer: [employer]   Social Needs    Financial resource strain: Not on file    Food insecurity     Worry: Not on file     Inability: Not on file    Transportation needs     Medical: Not on file     Non-medical: Not on file   Tobacco Use    Smoking status: Never Smoker   Substance and Sexual Activity    Alcohol use: Yes     Frequency: Monthly or less     Comment: occ 1x month    Drug use: No    Sexual activity: Yes     Partners: Male   Lifestyle    Physical activity     Days per week: Not on file     Minutes per session: Not on file    Stress: Not on file   Relationships    Social connections     Talks on phone: Not on file     Gets together: Not on file     Attends Religion service: Not on file     Active member of club or organization: Not on file     Attends meetings of clubs or organizations: Not on file     Relationship status: Not on file   Other Topics Concern    Not on file   Social History Narrative    Not on file     Family History   Problem Relation Age of Onset    Heart disease Maternal Grandmother     Diabetes Maternal Grandmother     Hypertension Father     Hypertension Mother     Stroke Mother 72     Review of patient's allergies indicates:   Allergen Reactions    Penicillins      Other reaction(s): Unknown     Current Outpatient Medications on File Prior to Visit   Medication Sig Dispense Refill    acetaminophen (TYLENOL) 325 MG tablet Take 325 mg by mouth every 6 (six) hours as needed for Pain.      ALPRAZolam (XANAX) 0.25 MG tablet Take 1 tablet (0.25 mg total) by mouth 3 (three) times daily as needed for Anxiety. 15 tablet 0    buPROPion (WELLBUTRIN XL) 150 MG TB24 tablet Take 1  tablet (150 mg total) by mouth once daily. 90 tablet 1    CARAFATE 100 mg/mL suspension TAKE TWO TEASPOONSFUL (10 MLS) BY MOUTH FOUR TIMES DAILY  6    estradiol (ESTRACE) 2 MG tablet Take 2 mg by mouth once daily.      pantoprazole (PROTONIX) 40 MG tablet Take 1 tablet (40 mg total) by mouth once daily. 30 tablet 5    prasterone, dhea, (INTRAROSA) 6.5 mg Inst Place 6.5 mg vaginally once daily.       No current facility-administered medications on file prior to visit.      Answers for HPI/ROS submitted by the patient on 10/8/2020   Cough  Chronicity: recurrent  Onset: yesterday  Progression since onset: waxing and waning  Frequency: hourly  Cough characteristics: non-productive  chest pain: No  chills: No  ear congestion: Yes  ear pain: Yes  fever: No  headaches: Yes  heartburn: No  hemoptysis: No  myalgias: No  nasal congestion: Yes  postnasal drip: Yes  rash: No  rhinorrhea: No  shortness of breath: No  sore throat: No  sweats: No  weight loss: No  wheezing: No  Aggravated by: lying down  asthma: No  bronchiectasis: No  bronchitis: No  COPD: No  emphysema: No  environmental allergies: Yes  pneumonia: No  Treatments tried: OTC cough suppressant  Improvement on treatment: moderate    There were no vitals filed for this visit.    Wt Readings from Last 3 Encounters:   09/28/20 70.3 kg (155 lb)   09/10/19 70.3 kg (155 lb)   08/07/19 70.7 kg (155 lb 12.8 oz)       APPEARANCE: Well nourished, well developed, in no acute distress.    MENTAL STATUS: Alert.  Oriented x 3.  Head atraumatic normocephalic no obvious sinus swelling  Eyes extraocular movements intact.  No obvious conjunctivitis  Neck supple  Chest no respiratory distress noted.  No accessory muscle use.

## 2020-12-14 ENCOUNTER — OFFICE VISIT (OUTPATIENT)
Dept: URGENT CARE | Facility: CLINIC | Age: 50
End: 2020-12-14
Payer: COMMERCIAL

## 2020-12-14 ENCOUNTER — TELEPHONE (OUTPATIENT)
Dept: FAMILY MEDICINE | Facility: CLINIC | Age: 50
End: 2020-12-14

## 2020-12-14 VITALS
HEART RATE: 79 BPM | RESPIRATION RATE: 16 BRPM | TEMPERATURE: 98 F | HEIGHT: 66 IN | SYSTOLIC BLOOD PRESSURE: 120 MMHG | WEIGHT: 155 LBS | BODY MASS INDEX: 24.91 KG/M2 | DIASTOLIC BLOOD PRESSURE: 79 MMHG | OXYGEN SATURATION: 99 %

## 2020-12-14 DIAGNOSIS — Z20.822 EXPOSURE TO COVID-19 VIRUS: Primary | ICD-10-CM

## 2020-12-14 DIAGNOSIS — R68.89 FLU-LIKE SYMPTOMS: ICD-10-CM

## 2020-12-14 DIAGNOSIS — Z11.9 SCREENING EXAMINATION FOR INFECTIOUS DISEASE: ICD-10-CM

## 2020-12-14 DIAGNOSIS — R51.9 NONINTRACTABLE HEADACHE, UNSPECIFIED CHRONICITY PATTERN, UNSPECIFIED HEADACHE TYPE: Primary | ICD-10-CM

## 2020-12-14 DIAGNOSIS — R51.9 HEAD ACHE: ICD-10-CM

## 2020-12-14 LAB
CTP QC/QA: YES
SARS-COV-2 RDRP RESP QL NAA+PROBE: NEGATIVE

## 2020-12-14 PROCEDURE — U0002: ICD-10-PCS | Mod: QW,S$GLB,, | Performed by: PHYSICIAN ASSISTANT

## 2020-12-14 PROCEDURE — 3008F PR BODY MASS INDEX (BMI) DOCUMENTED: ICD-10-PCS | Mod: CPTII,S$GLB,, | Performed by: PHYSICIAN ASSISTANT

## 2020-12-14 PROCEDURE — 99214 PR OFFICE/OUTPT VISIT, EST, LEVL IV, 30-39 MIN: ICD-10-PCS | Mod: S$GLB,CS,, | Performed by: PHYSICIAN ASSISTANT

## 2020-12-14 PROCEDURE — 3008F BODY MASS INDEX DOCD: CPT | Mod: CPTII,S$GLB,, | Performed by: PHYSICIAN ASSISTANT

## 2020-12-14 PROCEDURE — U0002 COVID-19 LAB TEST NON-CDC: HCPCS | Mod: QW,S$GLB,, | Performed by: PHYSICIAN ASSISTANT

## 2020-12-14 PROCEDURE — 99214 OFFICE O/P EST MOD 30 MIN: CPT | Mod: S$GLB,CS,, | Performed by: PHYSICIAN ASSISTANT

## 2020-12-14 RX ORDER — AZELASTINE 1 MG/ML
1 SPRAY, METERED NASAL 2 TIMES DAILY
Qty: 30 ML | Refills: 0 | Status: SHIPPED | OUTPATIENT
Start: 2020-12-14 | End: 2021-04-28

## 2020-12-14 RX ORDER — FLUTICASONE PROPIONATE 50 MCG
1 SPRAY, SUSPENSION (ML) NASAL 2 TIMES DAILY
Qty: 16 G | Refills: 0 | Status: SHIPPED | OUTPATIENT
Start: 2020-12-14 | End: 2021-04-28

## 2020-12-14 NOTE — TELEPHONE ENCOUNTER
----- Message from Debbie Lino sent at 12/14/2020  8:07 AM CST -----  Regarding: COVID TEST  Contact: patient  Patient requesting order for covid testing, her spouse tested positive. She currently is having scratchy throat and headache and ear ache. Please call back at [phone]

## 2020-12-14 NOTE — PATIENT INSTRUCTIONS
"A 'close exposure' is defined as anyone who has had an exposure (masked or unmasked) to a known COVID -19 positive person within 6 ft for longer than 15 minutes. If your exposure meets this definition you are required by CDC guidelines to quarantine for at least 7-10 days from time of exposure. The CDC states that a test can be performed for an asymptomatic patient (someone who does not have any symptoms) after a close exposure, and that a test should be done if you develop symptoms after a close exposure as described above.  Specifically, you can test at day 5 or later if asymptomatic, in order to get released from quarantine on day 7 or later.  If you develop symptoms sooner, you should test when your symptoms start.  ?If you meet the definition of a close exposure, it will not matter whether you are experiencing symptoms- a quarantine for at least 7-10 days after a close exposure is required by CDC guidelines.  ?Please note, if you decide to test as an asymptomatic during your quarantine and you are positive, you will be restarting your quarantine and moving from a possible 10 day quarantine (if you do not test), to a 11 day or greater quarantine.  ?The CDC also suggests people still monitor for symptoms for a full 14 days and remember that the shorter quarantine options do not replace initial CDC guidance.  The CDC continues to recommend quarantining for 14 days as the best way to reduce risk for spreading COVID-19 - however, this is only a recommendation.  ?If your exposure does not meet the above definition, you can return to your normal daily activities to include social distancing, wearing a mask and frequent handwashing.       NEGATIVE COVID TEST  ?You have tested negative for COVID-19 today.  If you did not have a close exposure (as defined below) you can return to your normal daily activities to include social distancing, wearing a mask and frequent handwashing.  ?A "close exposure" is defined as anyone " who has had an exposure (masked or unmasked) to a known COVID -19 positive person within 6 ft for longer than 15 minutes. If your exposure meets this definition, you are required by CDC guidelines to quarantine for at least 7-10 days from time of exposure.  ?The CDC states that a test can be performed for an asymptomatic patient (someone who does not have any symptoms) after a close exposure, and that a test should be done if you develop symptoms after a close exposure as described above.  ?Specifically, you can test at day 5 or later if asymptomatic in order to get released from quarantine on day 7 or later.  If you develop symptoms sooner, you should test when your symptoms start.  ?If you developed symptoms since the exposure, and your test was negative today and less than 5 days from your exposure, you still have to quarantine for 7-10 days from the date of the exposure.  ?The 7-10 day quarantine begins from the day you were exposed, not the day of your test.  For example, if your exposure was on a Monday, and you waited until Friday of the same week to get tested and it was negative, your 7-10 day quarantine begins from that Monday, not the Friday you tested negative.  ?Please note, if you decide to test as an asymptomatic during your quarantine and you are positive, you will be restarting your quarantine and moving from a possible 10 day quarantine (if you do not test), to a 11 day or greater quarantine.

## 2020-12-14 NOTE — PROGRESS NOTES
"Subjective:       Patient ID: Amita Bhakta is a 50 y.o. female.    Vitals:  height is 5' 5.5" (1.664 m) and weight is 70.3 kg (155 lb). Her temperature is 97.6 °F (36.4 °C). Her blood pressure is 120/79 and her pulse is 79. Her respiration is 16 and oxygen saturation is 99%.     Chief Complaint: COVID-19 Concerns    Patient's  tested positive for COVID 3 days ago. Patient begin experiencing symptoms on yesterday evening.  Symptoms: headaches, body aches, fatigue, sinus pressure, postnasal drip, cough.  Treatment tried: Tylenol, with no relief    Headache   This is a new problem. The current episode started yesterday. The problem occurs constantly. The problem has been gradually worsening. The pain is located in the bilateral region. The pain does not radiate. The quality of the pain is described as aching. The pain is at a severity of 7/10. Associated symptoms include dizziness, nausea and sinus pressure. Pertinent negatives include no blurred vision, eye pain, fever, loss of balance, neck pain, photophobia, tinnitus, vomiting or weakness. Treatments tried: tylenol. There is no history of migraine headaches.       Constitution: Positive for fatigue and generalized weakness. Negative for chills, sweating and fever.   HENT: Positive for postnasal drip and sinus pressure. Negative for tinnitus, facial swelling, congestion and sinus pain.    Neck: Negative for neck pain and neck stiffness.   Eyes: Negative for eye pain, photophobia, vision loss, double vision and blurred vision.   Gastrointestinal: Positive for nausea. Negative for vomiting.   Genitourinary: Negative for missed menses.   Musculoskeletal: Positive for joint pain and muscle ache. Negative for trauma.   Skin: Negative for rash, wound and lesion.   Neurological: Positive for dizziness and headaches. Negative for history of vertigo, light-headedness, facial drooping, speech difficulty, coordination disturbances, loss of balance, history of " migraines, disorientation and loss of consciousness.   Psychiatric/Behavioral: Negative for disorientation, confusion, nervous/anxious, sleep disturbance and depression. The patient is not nervous/anxious.        Objective:      Physical Exam   Constitutional: She is oriented to person, place, and time. She appears well-developed. She is cooperative.  Non-toxic appearance. She does not appear ill. No distress.   HENT:   Head: Normocephalic and atraumatic.   Ears:   Right Ear: Hearing, external ear and ear canal normal. Tympanic membrane is scarred. A middle ear effusion (Clear) is present.   Left Ear: Hearing, external ear and ear canal normal. Tympanic membrane is scarred. A middle ear effusion (Clear) is present.   Nose: Nose normal. No mucosal edema, rhinorrhea or nasal deformity. No epistaxis. Right sinus exhibits no maxillary sinus tenderness and no frontal sinus tenderness. Left sinus exhibits no maxillary sinus tenderness and no frontal sinus tenderness.   Mouth/Throat: Uvula is midline, oropharynx is clear and moist and mucous membranes are normal. No trismus in the jaw. Normal dentition. No uvula swelling. No oropharyngeal exudate, posterior oropharyngeal edema or posterior oropharyngeal erythema.   Eyes: Conjunctivae and lids are normal. No scleral icterus.   Neck: Trachea normal, full passive range of motion without pain and phonation normal. Neck supple. No neck rigidity. No edema and no erythema present.   Cardiovascular: Normal rate, regular rhythm, normal heart sounds and normal pulses.   Pulmonary/Chest: Effort normal and breath sounds normal. No respiratory distress. She has no decreased breath sounds. She has no wheezes. She has no rhonchi.   Abdominal: Normal appearance.   Musculoskeletal: Normal range of motion.         General: No deformity.   Neurological: She is alert and oriented to person, place, and time. She exhibits normal muscle tone. Coordination normal.   Skin: Skin is warm, dry,  intact, not diaphoretic and not pale. Psychiatric: Her speech is normal and behavior is normal. Mood, judgment and thought content normal.   Nursing note and vitals reviewed.        Assessment:       1. Exposure to COVID-19 virus    2. Flu-like symptoms        Plan:         Exposure to COVID-19 virus    Flu-like symptoms  -     POCT COVID-19 Rapid Screening    Results for orders placed or performed in visit on 12/14/20   POCT COVID-19 Rapid Screening   Result Value Ref Range    POC Rapid COVID Negative Negative     Acceptable Yes        Other orders  -     azelastine (ASTELIN) 137 mcg (0.1 %) nasal spray; 1 spray (137 mcg total) by Nasal route 2 (two) times daily.  Dispense: 30 mL; Refill: 0  -     fluticasone propionate (FLONASE ALLERGY RELIEF) 50 mcg/actuation nasal spray; 1 spray (50 mcg total) by Each Nostril route 2 (two) times daily.  Dispense: 16 g; Refill: 0    Patient Instructions   A 'close exposure' is defined as anyone who has had an exposure (masked or unmasked) to a known COVID -19 positive person within 6 ft for longer than 15 minutes. If your exposure meets this definition you are required by CDC guidelines to quarantine for at least 7-10 days from time of exposure. The CDC states that a test can be performed for an asymptomatic patient (someone who does not have any symptoms) after a close exposure, and that a test should be done if you develop symptoms after a close exposure as described above.  Specifically, you can test at day 5 or later if asymptomatic, in order to get released from quarantine on day 7 or later.  If you develop symptoms sooner, you should test when your symptoms start.  ?If you meet the definition of a close exposure, it will not matter whether you are experiencing symptoms- a quarantine for at least 7-10 days after a close exposure is required by CDC guidelines.  ?Please note, if you decide to test as an asymptomatic during your quarantine and you are positive,  "you will be restarting your quarantine and moving from a possible 10 day quarantine (if you do not test), to a 11 day or greater quarantine.  ?The CDC also suggests people still monitor for symptoms for a full 14 days and remember that the shorter quarantine options do not replace initial CDC guidance.  The CDC continues to recommend quarantining for 14 days as the best way to reduce risk for spreading COVID-19 - however, this is only a recommendation.  ?If your exposure does not meet the above definition, you can return to your normal daily activities to include social distancing, wearing a mask and frequent handwashing.       NEGATIVE COVID TEST  ?You have tested negative for COVID-19 today.  If you did not have a close exposure (as defined below) you can return to your normal daily activities to include social distancing, wearing a mask and frequent handwashing.  ?A "close exposure" is defined as anyone who has had an exposure (masked or unmasked) to a known COVID -19 positive person within 6 ft for longer than 15 minutes. If your exposure meets this definition, you are required by CDC guidelines to quarantine for at least 7-10 days from time of exposure.  ?The CDC states that a test can be performed for an asymptomatic patient (someone who does not have any symptoms) after a close exposure, and that a test should be done if you develop symptoms after a close exposure as described above.  ?Specifically, you can test at day 5 or later if asymptomatic in order to get released from quarantine on day 7 or later.  If you develop symptoms sooner, you should test when your symptoms start.  ?If you developed symptoms since the exposure, and your test was negative today and less than 5 days from your exposure, you still have to quarantine for 7-10 days from the date of the exposure.  ?The 7-10 day quarantine begins from the day you were exposed, not the day of your test.  For example, if your exposure was on a Monday, " and you waited until Friday of the same week to get tested and it was negative, your 7-10 day quarantine begins from that Monday, not the Friday you tested negative.  ?Please note, if you decide to test as an asymptomatic during your quarantine and you are positive, you will be restarting your quarantine and moving from a possible 10 day quarantine (if you do not test), to a 11 day or greater quarantine.

## 2020-12-18 ENCOUNTER — TELEPHONE (OUTPATIENT)
Dept: FAMILY MEDICINE | Facility: CLINIC | Age: 50
End: 2020-12-18

## 2020-12-18 NOTE — TELEPHONE ENCOUNTER
----- Message from Radha Mcclain sent at 12/18/2020 10:39 AM CST -----  Pt would like to confirm the type of Covid-19 order that was put in by provider. Would like to know is it rapid or 72 hr one. Please call back at [phone]

## 2021-04-01 LAB
CHOLEST SERPL-MSCNC: 195 MG/DL (ref 0–200)
HDLC SERPL-MCNC: 100 MG/DL (ref 35–70)
LDL/HDL RATIO: 2 (ref 0–5)
TRIGL SERPL-MCNC: 85 MG/DL (ref 0–150)
VLDL CHOLESTEROL: 17 MG/DL (ref 5–40)

## 2021-04-12 ENCOUNTER — PATIENT OUTREACH (OUTPATIENT)
Dept: ADMINISTRATIVE | Facility: HOSPITAL | Age: 51
End: 2021-04-12

## 2021-04-12 VITALS — DIASTOLIC BLOOD PRESSURE: 83 MMHG | SYSTOLIC BLOOD PRESSURE: 131 MMHG

## 2021-04-13 ENCOUNTER — PATIENT OUTREACH (OUTPATIENT)
Dept: ADMINISTRATIVE | Facility: HOSPITAL | Age: 51
End: 2021-04-13

## 2021-04-14 ENCOUNTER — TELEPHONE (OUTPATIENT)
Dept: FAMILY MEDICINE | Facility: CLINIC | Age: 51
End: 2021-04-14

## 2021-04-14 RX ORDER — ALPRAZOLAM 0.25 MG/1
0.25 TABLET ORAL 3 TIMES DAILY PRN
Qty: 15 TABLET | Refills: 0 | Status: SHIPPED | OUTPATIENT
Start: 2021-04-14 | End: 2023-07-12

## 2021-04-28 ENCOUNTER — OFFICE VISIT (OUTPATIENT)
Dept: FAMILY MEDICINE | Facility: CLINIC | Age: 51
End: 2021-04-28
Payer: COMMERCIAL

## 2021-04-28 VITALS
WEIGHT: 153 LBS | DIASTOLIC BLOOD PRESSURE: 89 MMHG | HEART RATE: 82 BPM | SYSTOLIC BLOOD PRESSURE: 139 MMHG | TEMPERATURE: 98 F | BODY MASS INDEX: 25.07 KG/M2

## 2021-04-28 DIAGNOSIS — Z63.4 BEREAVEMENT: Primary | ICD-10-CM

## 2021-04-28 DIAGNOSIS — F41.9 ANXIETY: ICD-10-CM

## 2021-04-28 PROCEDURE — 99213 OFFICE O/P EST LOW 20 MIN: CPT | Mod: S$GLB,,, | Performed by: FAMILY MEDICINE

## 2021-04-28 PROCEDURE — 3008F BODY MASS INDEX DOCD: CPT | Mod: CPTII,S$GLB,, | Performed by: FAMILY MEDICINE

## 2021-04-28 PROCEDURE — 1126F AMNT PAIN NOTED NONE PRSNT: CPT | Mod: S$GLB,,, | Performed by: FAMILY MEDICINE

## 2021-04-28 PROCEDURE — 99213 PR OFFICE/OUTPT VISIT, EST, LEVL III, 20-29 MIN: ICD-10-PCS | Mod: S$GLB,,, | Performed by: FAMILY MEDICINE

## 2021-04-28 PROCEDURE — 3008F PR BODY MASS INDEX (BMI) DOCUMENTED: ICD-10-PCS | Mod: CPTII,S$GLB,, | Performed by: FAMILY MEDICINE

## 2021-04-28 PROCEDURE — 99999 PR PBB SHADOW E&M-EST. PATIENT-LVL III: CPT | Mod: PBBFAC,,, | Performed by: FAMILY MEDICINE

## 2021-04-28 PROCEDURE — 99999 PR PBB SHADOW E&M-EST. PATIENT-LVL III: ICD-10-PCS | Mod: PBBFAC,,, | Performed by: FAMILY MEDICINE

## 2021-04-28 PROCEDURE — 1126F PR PAIN SEVERITY QUANTIFIED, NO PAIN PRESENT: ICD-10-PCS | Mod: S$GLB,,, | Performed by: FAMILY MEDICINE

## 2021-04-28 RX ORDER — BUPROPION HYDROCHLORIDE 300 MG/1
300 TABLET ORAL DAILY
COMMUNITY
Start: 2020-04-28

## 2021-04-28 SDOH — SOCIAL DETERMINANTS OF HEALTH (SDOH): DISSAPEARANCE AND DEATH OF FAMILY MEMBER: Z63.4

## 2021-05-03 ENCOUNTER — TELEPHONE (OUTPATIENT)
Dept: FAMILY MEDICINE | Facility: CLINIC | Age: 51
End: 2021-05-03

## 2021-05-03 ENCOUNTER — PATIENT MESSAGE (OUTPATIENT)
Dept: FAMILY MEDICINE | Facility: CLINIC | Age: 51
End: 2021-05-03

## 2021-05-06 ENCOUNTER — PATIENT MESSAGE (OUTPATIENT)
Dept: RESEARCH | Facility: HOSPITAL | Age: 51
End: 2021-05-06

## 2021-05-11 ENCOUNTER — TELEPHONE (OUTPATIENT)
Dept: FAMILY MEDICINE | Facility: CLINIC | Age: 51
End: 2021-05-11

## 2021-05-21 ENCOUNTER — TELEPHONE (OUTPATIENT)
Dept: FAMILY MEDICINE | Facility: CLINIC | Age: 51
End: 2021-05-21

## 2021-05-21 ENCOUNTER — TELEPHONE (OUTPATIENT)
Dept: PSYCHIATRY | Facility: CLINIC | Age: 51
End: 2021-05-21

## 2021-05-21 DIAGNOSIS — Z63.4 GRIEF AT LOSS OF CHILD: ICD-10-CM

## 2021-05-21 DIAGNOSIS — F32.89 OTHER DEPRESSION: Primary | ICD-10-CM

## 2021-05-21 DIAGNOSIS — F43.21 GRIEF AT LOSS OF CHILD: ICD-10-CM

## 2021-05-21 SDOH — SOCIAL DETERMINANTS OF HEALTH (SDOH): DISSAPEARANCE AND DEATH OF FAMILY MEMBER: Z63.4

## 2021-05-24 ENCOUNTER — PATIENT MESSAGE (OUTPATIENT)
Dept: FAMILY MEDICINE | Facility: CLINIC | Age: 51
End: 2021-05-24

## 2021-06-08 ENCOUNTER — OFFICE VISIT (OUTPATIENT)
Dept: PSYCHIATRY | Facility: CLINIC | Age: 51
End: 2021-06-08
Payer: COMMERCIAL

## 2021-06-08 DIAGNOSIS — F41.9 ANXIETY DISORDER, UNSPECIFIED TYPE: ICD-10-CM

## 2021-06-08 PROCEDURE — 99999 PR PBB SHADOW E&M-EST. PATIENT-LVL I: CPT | Mod: PBBFAC,,, | Performed by: PSYCHOLOGIST

## 2021-06-08 PROCEDURE — 99999 PR PBB SHADOW E&M-EST. PATIENT-LVL I: ICD-10-PCS | Mod: PBBFAC,,, | Performed by: PSYCHOLOGIST

## 2021-06-08 PROCEDURE — 90791 PSYCH DIAGNOSTIC EVALUATION: CPT | Mod: S$GLB,,, | Performed by: PSYCHOLOGIST

## 2021-06-08 PROCEDURE — 90791 PR PSYCHIATRIC DIAGNOSTIC EVALUATION: ICD-10-PCS | Mod: S$GLB,,, | Performed by: PSYCHOLOGIST

## 2021-07-07 ENCOUNTER — OFFICE VISIT (OUTPATIENT)
Dept: FAMILY MEDICINE | Facility: CLINIC | Age: 51
End: 2021-07-07
Payer: COMMERCIAL

## 2021-07-07 VITALS
WEIGHT: 154.13 LBS | OXYGEN SATURATION: 100 % | TEMPERATURE: 98 F | RESPIRATION RATE: 20 BRPM | DIASTOLIC BLOOD PRESSURE: 74 MMHG | HEIGHT: 66 IN | BODY MASS INDEX: 24.77 KG/M2 | SYSTOLIC BLOOD PRESSURE: 118 MMHG | HEART RATE: 95 BPM

## 2021-07-07 DIAGNOSIS — F41.9 ANXIETY: ICD-10-CM

## 2021-07-07 DIAGNOSIS — Z63.4 BEREAVEMENT: Primary | ICD-10-CM

## 2021-07-07 PROCEDURE — 1126F PR PAIN SEVERITY QUANTIFIED, NO PAIN PRESENT: ICD-10-PCS | Mod: S$GLB,,, | Performed by: FAMILY MEDICINE

## 2021-07-07 PROCEDURE — 99999 PR PBB SHADOW E&M-EST. PATIENT-LVL IV: CPT | Mod: PBBFAC,,, | Performed by: FAMILY MEDICINE

## 2021-07-07 PROCEDURE — 3008F BODY MASS INDEX DOCD: CPT | Mod: CPTII,S$GLB,, | Performed by: FAMILY MEDICINE

## 2021-07-07 PROCEDURE — 1126F AMNT PAIN NOTED NONE PRSNT: CPT | Mod: S$GLB,,, | Performed by: FAMILY MEDICINE

## 2021-07-07 PROCEDURE — 99214 PR OFFICE/OUTPT VISIT, EST, LEVL IV, 30-39 MIN: ICD-10-PCS | Mod: S$GLB,,, | Performed by: FAMILY MEDICINE

## 2021-07-07 PROCEDURE — 99214 OFFICE O/P EST MOD 30 MIN: CPT | Mod: S$GLB,,, | Performed by: FAMILY MEDICINE

## 2021-07-07 PROCEDURE — 99999 PR PBB SHADOW E&M-EST. PATIENT-LVL IV: ICD-10-PCS | Mod: PBBFAC,,, | Performed by: FAMILY MEDICINE

## 2021-07-07 PROCEDURE — 3008F PR BODY MASS INDEX (BMI) DOCUMENTED: ICD-10-PCS | Mod: CPTII,S$GLB,, | Performed by: FAMILY MEDICINE

## 2021-07-07 SDOH — SOCIAL DETERMINANTS OF HEALTH (SDOH): DISSAPEARANCE AND DEATH OF FAMILY MEMBER: Z63.4

## 2021-10-13 DIAGNOSIS — Z12.31 OTHER SCREENING MAMMOGRAM: ICD-10-CM

## 2021-11-19 ENCOUNTER — HOSPITAL ENCOUNTER (OUTPATIENT)
Dept: RADIOLOGY | Facility: HOSPITAL | Age: 51
Discharge: HOME | End: 2021-11-19
Attending: FAMILY MEDICINE
Payer: COMMERCIAL

## 2021-11-19 VITALS — HEIGHT: 66 IN | BODY MASS INDEX: 24.77 KG/M2 | WEIGHT: 154.13 LBS

## 2021-11-19 DIAGNOSIS — Z12.31 OTHER SCREENING MAMMOGRAM: ICD-10-CM

## 2021-11-19 PROCEDURE — 77063 BREAST TOMOSYNTHESIS BI: CPT | Mod: 26,,, | Performed by: RADIOLOGY

## 2021-11-19 PROCEDURE — 77067 MAMMO DIGITAL SCREENING BILAT WITH TOMO: ICD-10-PCS | Mod: 26,,, | Performed by: RADIOLOGY

## 2021-11-19 PROCEDURE — 77067 SCR MAMMO BI INCL CAD: CPT | Mod: 26,,, | Performed by: RADIOLOGY

## 2021-11-19 PROCEDURE — 77063 MAMMO DIGITAL SCREENING BILAT WITH TOMO: ICD-10-PCS | Mod: 26,,, | Performed by: RADIOLOGY

## 2021-11-19 PROCEDURE — 77067 SCR MAMMO BI INCL CAD: CPT | Mod: TC,PO

## 2022-01-04 ENCOUNTER — PATIENT MESSAGE (OUTPATIENT)
Dept: FAMILY MEDICINE | Facility: CLINIC | Age: 52
End: 2022-01-04
Payer: COMMERCIAL

## 2022-01-04 DIAGNOSIS — U07.1 COVID-19 VIRUS INFECTION: ICD-10-CM

## 2022-01-05 PROBLEM — U07.1 COVID-19 VIRUS INFECTION: Status: ACTIVE | Noted: 2022-01-05

## 2022-03-14 LAB
CHOLEST SERPL-MSCNC: 198 MG/DL (ref 100–200)
HDL/CHOLESTEROL RATIO: NORMAL
HDLC SERPL-MCNC: NORMAL MG/DL
LDLC SERPL CALC-MCNC: NORMAL MG/DL
TRIGL SERPL-MCNC: 108 MG/DL
VLDLC SERPL-MCNC: 22 MG/DL (ref 5–40)

## 2022-03-18 ENCOUNTER — PATIENT MESSAGE (OUTPATIENT)
Dept: ADMINISTRATIVE | Facility: HOSPITAL | Age: 52
End: 2022-03-18
Payer: COMMERCIAL

## 2022-04-05 ENCOUNTER — PATIENT OUTREACH (OUTPATIENT)
Dept: ADMINISTRATIVE | Facility: HOSPITAL | Age: 52
End: 2022-04-05
Payer: COMMERCIAL

## 2022-11-30 ENCOUNTER — TELEPHONE (OUTPATIENT)
Dept: FAMILY MEDICINE | Facility: CLINIC | Age: 52
End: 2022-11-30
Payer: COMMERCIAL

## 2022-11-30 DIAGNOSIS — Z12.31 ENCOUNTER FOR SCREENING MAMMOGRAM FOR BREAST CANCER: Primary | ICD-10-CM

## 2022-12-14 ENCOUNTER — TELEPHONE (OUTPATIENT)
Dept: FAMILY MEDICINE | Facility: CLINIC | Age: 52
End: 2022-12-14
Payer: COMMERCIAL

## 2022-12-15 ENCOUNTER — HOSPITAL ENCOUNTER (OUTPATIENT)
Dept: RADIOLOGY | Facility: HOSPITAL | Age: 52
Discharge: HOME | End: 2022-12-15
Attending: FAMILY MEDICINE
Payer: COMMERCIAL

## 2022-12-15 VITALS — HEIGHT: 66 IN | BODY MASS INDEX: 24.77 KG/M2 | WEIGHT: 154.13 LBS

## 2022-12-15 DIAGNOSIS — Z12.31 ENCOUNTER FOR SCREENING MAMMOGRAM FOR BREAST CANCER: ICD-10-CM

## 2022-12-15 PROCEDURE — 77067 SCR MAMMO BI INCL CAD: CPT | Mod: 26,,, | Performed by: RADIOLOGY

## 2022-12-15 PROCEDURE — 77063 MAMMO DIGITAL SCREENING BILAT WITH TOMO: ICD-10-PCS | Mod: 26,,, | Performed by: RADIOLOGY

## 2022-12-15 PROCEDURE — 77063 BREAST TOMOSYNTHESIS BI: CPT | Mod: 26,,, | Performed by: RADIOLOGY

## 2022-12-15 PROCEDURE — 77067 SCR MAMMO BI INCL CAD: CPT | Mod: TC,PO

## 2022-12-15 PROCEDURE — 77067 MAMMO DIGITAL SCREENING BILAT WITH TOMO: ICD-10-PCS | Mod: 26,,, | Performed by: RADIOLOGY

## 2022-12-15 PROCEDURE — 77063 BREAST TOMOSYNTHESIS BI: CPT | Mod: TC,PO

## 2022-12-20 DIAGNOSIS — Z12.31 OTHER SCREENING MAMMOGRAM: ICD-10-CM

## 2023-04-19 ENCOUNTER — PATIENT MESSAGE (OUTPATIENT)
Dept: ADMINISTRATIVE | Facility: HOSPITAL | Age: 53
End: 2023-04-19
Payer: COMMERCIAL

## 2023-07-12 ENCOUNTER — OFFICE VISIT (OUTPATIENT)
Dept: FAMILY MEDICINE | Facility: CLINIC | Age: 53
End: 2023-07-12
Payer: COMMERCIAL

## 2023-07-12 VITALS
TEMPERATURE: 97 F | SYSTOLIC BLOOD PRESSURE: 109 MMHG | HEART RATE: 70 BPM | DIASTOLIC BLOOD PRESSURE: 80 MMHG | BODY MASS INDEX: 25.66 KG/M2 | HEIGHT: 66 IN | WEIGHT: 159.69 LBS

## 2023-07-12 DIAGNOSIS — Z11.59 NEED FOR HEPATITIS C SCREENING TEST: ICD-10-CM

## 2023-07-12 DIAGNOSIS — F41.9 ANXIETY: ICD-10-CM

## 2023-07-12 DIAGNOSIS — Z11.4 SCREENING FOR HIV (HUMAN IMMUNODEFICIENCY VIRUS): ICD-10-CM

## 2023-07-12 DIAGNOSIS — Z00.00 ROUTINE HISTORY AND PHYSICAL EXAMINATION OF ADULT: Primary | ICD-10-CM

## 2023-07-12 DIAGNOSIS — D32.9 MENINGIOMA: ICD-10-CM

## 2023-07-12 DIAGNOSIS — D75.89 MACROCYTOSIS: ICD-10-CM

## 2023-07-12 PROBLEM — U07.1 COVID-19 VIRUS INFECTION: Status: RESOLVED | Noted: 2022-01-05 | Resolved: 2023-07-12

## 2023-07-12 PROCEDURE — 3008F PR BODY MASS INDEX (BMI) DOCUMENTED: ICD-10-PCS | Mod: CPTII,S$GLB,, | Performed by: FAMILY MEDICINE

## 2023-07-12 PROCEDURE — 99999 PR PBB SHADOW E&M-EST. PATIENT-LVL III: ICD-10-PCS | Mod: PBBFAC,,, | Performed by: FAMILY MEDICINE

## 2023-07-12 PROCEDURE — 99396 PREV VISIT EST AGE 40-64: CPT | Mod: S$GLB,,, | Performed by: FAMILY MEDICINE

## 2023-07-12 PROCEDURE — 1159F PR MEDICATION LIST DOCUMENTED IN MEDICAL RECORD: ICD-10-PCS | Mod: CPTII,S$GLB,, | Performed by: FAMILY MEDICINE

## 2023-07-12 PROCEDURE — 1159F MED LIST DOCD IN RCRD: CPT | Mod: CPTII,S$GLB,, | Performed by: FAMILY MEDICINE

## 2023-07-12 PROCEDURE — 3079F DIAST BP 80-89 MM HG: CPT | Mod: CPTII,S$GLB,, | Performed by: FAMILY MEDICINE

## 2023-07-12 PROCEDURE — 3074F PR MOST RECENT SYSTOLIC BLOOD PRESSURE < 130 MM HG: ICD-10-PCS | Mod: CPTII,S$GLB,, | Performed by: FAMILY MEDICINE

## 2023-07-12 PROCEDURE — 99396 PR PREVENTIVE VISIT,EST,40-64: ICD-10-PCS | Mod: S$GLB,,, | Performed by: FAMILY MEDICINE

## 2023-07-12 PROCEDURE — 99999 PR PBB SHADOW E&M-EST. PATIENT-LVL III: CPT | Mod: PBBFAC,,, | Performed by: FAMILY MEDICINE

## 2023-07-12 PROCEDURE — 3079F PR MOST RECENT DIASTOLIC BLOOD PRESSURE 80-89 MM HG: ICD-10-PCS | Mod: CPTII,S$GLB,, | Performed by: FAMILY MEDICINE

## 2023-07-12 PROCEDURE — 3008F BODY MASS INDEX DOCD: CPT | Mod: CPTII,S$GLB,, | Performed by: FAMILY MEDICINE

## 2023-07-12 PROCEDURE — 3074F SYST BP LT 130 MM HG: CPT | Mod: CPTII,S$GLB,, | Performed by: FAMILY MEDICINE

## 2023-07-12 RX ORDER — CEPHALEXIN 500 MG/1
500 CAPSULE ORAL EVERY 6 HOURS
COMMUNITY

## 2023-07-12 RX ORDER — GABAPENTIN 300 MG/1
300 CAPSULE ORAL NIGHTLY
COMMUNITY
Start: 2022-08-05

## 2023-07-12 NOTE — PROGRESS NOTES
Patient presents for physical exam.  States recently diagnosed with strep throat and is completing Keflex.  Prior macrocytosis with negative hematology evaluation.  Anxiety uses Wellbutrin through her gynecologist.  Stable.  Remote tiny meningioma on outside CT with negative MRI asymptomatic    Amita was seen today for annual exam.    Diagnoses and all orders for this visit:    Routine history and physical examination of adult  -     Comprehensive Metabolic Panel; Future  -     Lipid Panel; Future  -     Hemoglobin A1C; Future  -     Hepatitis C Antibody; Future  -     Cologuard Screening (Multitarget Stool DNA); Future  -     HIV 1/2 Ag/Ab (4th Gen); Future  -     CBC Auto Differential; Future  -     Cologuard Screening (Multitarget Stool DNA)    Macrocytosis  -     CBC Auto Differential; Future    Anxiety    Screening for HIV (human immunodeficiency virus)  -     Cologuard Screening (Multitarget Stool DNA); Future  -     HIV 1/2 Ag/Ab (4th Gen); Future  -     Cologuard Screening (Multitarget Stool DNA)    Need for hepatitis C screening test  -     Hepatitis C Antibody; Future    Meningioma    Other orders  -     Zoster Recombinant Vaccine  -     Zoster Recombinant Vaccine; Future  -     Tdap Vaccine    Arrange above laboratory.  Will schedule vaccines future date.    Anticipatory guidance: Don't smoke.  Healthy diet and regular exercise recommended.          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    COVID-19 virus infection 1/5/2022    Depression     Gastric ulcer with hemorrhage     H pylori ulcer     History of blood transfusion     Meningioma 7/12/2023    Migraine headache      Past Surgical History:   Procedure Laterality Date    BREAST BIOPSY      HAND TENDON SURGERY      HYSTERECTOMY      ovaries intact    TYMPANOSTOMY      TYMPANOSTOMY TUBE PLACEMENT      urethral dilation  "      Review of patient's allergies indicates:   Allergen Reactions    Penicillin Hives     Current Outpatient Medications on File Prior to Visit   Medication Sig Dispense Refill    acetaminophen (TYLENOL) 325 MG tablet Take 325 mg by mouth every 6 (six) hours as needed for Pain.      buPROPion (WELLBUTRIN XL) 300 MG 24 hr tablet Take 300 mg by mouth once daily.       cephALEXin (KEFLEX) 500 MG capsule Take 500 mg by mouth every 6 (six) hours.      estradiol (ESTRACE) 2 MG tablet Take 2 mg by mouth once daily.      gabapentin (NEURONTIN) 300 MG capsule Take 300 mg by mouth every evening.      [DISCONTINUED] ALPRAZolam (XANAX) 0.25 MG tablet Take 1 tablet (0.25 mg total) by mouth 3 (three) times daily as needed for Anxiety. 15 tablet 0     No current facility-administered medications on file prior to visit.     Social History     Socioeconomic History    Marital status:    Occupational History     Employer: [employer]   Tobacco Use    Smoking status: Never    Smokeless tobacco: Never   Substance and Sexual Activity    Alcohol use: Yes     Comment: occ 1x month    Drug use: No    Sexual activity: Yes     Partners: Male     Family History   Problem Relation Age of Onset    Hypertension Mother     Stroke Mother 72    Hypertension Father     Heart disease Maternal Grandmother     Diabetes Maternal Grandmother     Lung cancer Maternal Grandfather     Cancer Paternal Grandfather     Ovarian cancer Cousin            ROS:  GENERAL: No fever, chills,  or significant weight changes.   CARDIOVASCULAR: Denies chest pain, PND, orthopnea or reduced exercise tolerance.  ABDOMEN: Appetite fine. Denies diarrhea, abdominal pain, hematemesis or blood in stool.  URINARY: No flank pain, dysuria or hematuria.    Vitals:    07/12/23 1602   BP: 109/80   Pulse: 70   Temp: 97.3 °F (36.3 °C)   TempSrc: Temporal   Weight: 72.4 kg (159 lb 11.2 oz)   Height: 5' 6" (1.676 m)     Wt Readings from Last 3 Encounters:   07/12/23 72.4 kg " (159 lb 11.2 oz)   12/15/22 69.9 kg (154 lb 1.6 oz)   11/19/21 69.9 kg (154 lb 1.6 oz)       OBJECTIVE:   APPEARANCE: Well nourished, well developed, in no acute distress.    HEAD: Normocephalic.  Atraumatic.  No sinus tenderness.  EYES:   Right eye: Pupil reactive.  Conjunctiva clear.    Left eye: Pupil reactive.  Conjunctiva clear.  EOMI.    EARS: TM's intact. Light reflex normal. No retraction or perforation.    NOSE:  clear.  MOUTH & THROAT:  No pharyngeal erythema or exudate. No lesions.  NECK: Supple. No bruits.  No JVD.  No cervical lymphadenopathy.  No thyromegaly.    CHEST: Breath sounds clear bilaterally.  Normal respiratory effort  CARDIOVASCULAR: Normal rate.  Regular rhythm.  No murmurs.  No rub.  No gallops.  ABDOMEN: Bowel sounds normal.  Soft.  No tenderness.  No organomegaly.  PERIPHERAL VASCULAR: No cyanosis.  No clubbing.  No edema.  NEUROLOGIC: No focal findings.  MENTAL STATUS: Alert.  Oriented x 3.

## 2023-08-12 ENCOUNTER — PATIENT MESSAGE (OUTPATIENT)
Dept: ADMINISTRATIVE | Facility: HOSPITAL | Age: 53
End: 2023-08-12
Payer: COMMERCIAL

## 2023-08-19 LAB — NONINV COLON CA DNA+OCC BLD SCRN STL QL: NEGATIVE

## 2024-01-24 ENCOUNTER — HOSPITAL ENCOUNTER (OUTPATIENT)
Dept: RADIOLOGY | Facility: HOSPITAL | Age: 54
Discharge: HOME | End: 2024-01-24
Attending: FAMILY MEDICINE
Payer: COMMERCIAL

## 2024-01-24 DIAGNOSIS — Z12.31 OTHER SCREENING MAMMOGRAM: ICD-10-CM

## 2024-01-24 PROCEDURE — 77067 SCR MAMMO BI INCL CAD: CPT | Mod: TC,PO

## 2024-01-24 PROCEDURE — 77067 SCR MAMMO BI INCL CAD: CPT | Mod: 26,,, | Performed by: RADIOLOGY

## 2024-01-24 PROCEDURE — 77063 BREAST TOMOSYNTHESIS BI: CPT | Mod: 26,,, | Performed by: RADIOLOGY

## 2024-02-09 LAB
CHOLESTEROL, TOTAL: 198 (ref 100–200)
HDL/CHOLESTEROL RATIO: 2.1 %
HDLC SERPL-MCNC: 93 MG/DL
LDLC SERPL CALC-MCNC: 86 MG/DL
TRIGL SERPL-MCNC: 94 MG/DL
VLDLC SERPL-MCNC: 19 MG/DL (ref 5–40)

## 2024-02-20 ENCOUNTER — PATIENT OUTREACH (OUTPATIENT)
Dept: ADMINISTRATIVE | Facility: HOSPITAL | Age: 54
End: 2024-02-20
Payer: COMMERCIAL

## 2024-10-14 NOTE — TELEPHONE ENCOUNTER
----- Message from Gardenia Song sent at 12/19/2019  8:24 AM CST -----  Contact: Pt   Pt is calling .Type:  Same Day Appointment Request    Pt  is requesting a same day appointment.  Pt  declined first available appointment listed below.    Name of Caller Pt   When is the first available appointment? 12/23/2019  Symptoms: sinus infection   Best Call Back Number: [phone]           .Thank You  Gardenia Song   '     2

## 2024-11-19 ENCOUNTER — OFFICE VISIT (OUTPATIENT)
Dept: FAMILY MEDICINE | Facility: CLINIC | Age: 54
End: 2024-11-19
Payer: COMMERCIAL

## 2024-11-19 VITALS
SYSTOLIC BLOOD PRESSURE: 117 MMHG | HEIGHT: 66 IN | HEART RATE: 70 BPM | TEMPERATURE: 97 F | BODY MASS INDEX: 21.45 KG/M2 | WEIGHT: 133.5 LBS | DIASTOLIC BLOOD PRESSURE: 75 MMHG

## 2024-11-19 DIAGNOSIS — F41.9 ANXIETY: ICD-10-CM

## 2024-11-19 DIAGNOSIS — Z00.00 ROUTINE HISTORY AND PHYSICAL EXAMINATION OF ADULT: Primary | ICD-10-CM

## 2024-11-19 DIAGNOSIS — Z11.4 SCREENING FOR HIV (HUMAN IMMUNODEFICIENCY VIRUS): ICD-10-CM

## 2024-11-19 DIAGNOSIS — Z14.8 CARRIER OF HEMOCHROMATOSIS HFE GENE MUTATION: ICD-10-CM

## 2024-11-19 DIAGNOSIS — Z23 NEED FOR VACCINATION: ICD-10-CM

## 2024-11-19 DIAGNOSIS — D32.9 MENINGIOMA: ICD-10-CM

## 2024-11-19 DIAGNOSIS — Z11.59 NEED FOR HEPATITIS C SCREENING TEST: ICD-10-CM

## 2024-11-19 DIAGNOSIS — F32.5 MAJOR DEPRESSIVE DISORDER IN FULL REMISSION, UNSPECIFIED WHETHER RECURRENT: ICD-10-CM

## 2024-11-19 PROCEDURE — 3074F SYST BP LT 130 MM HG: CPT | Mod: CPTII,S$GLB,, | Performed by: FAMILY MEDICINE

## 2024-11-19 PROCEDURE — 3078F DIAST BP <80 MM HG: CPT | Mod: CPTII,S$GLB,, | Performed by: FAMILY MEDICINE

## 2024-11-19 PROCEDURE — 1159F MED LIST DOCD IN RCRD: CPT | Mod: CPTII,S$GLB,, | Performed by: FAMILY MEDICINE

## 2024-11-19 PROCEDURE — 3008F BODY MASS INDEX DOCD: CPT | Mod: CPTII,S$GLB,, | Performed by: FAMILY MEDICINE

## 2024-11-19 PROCEDURE — 90715 TDAP VACCINE 7 YRS/> IM: CPT | Mod: S$GLB,,, | Performed by: FAMILY MEDICINE

## 2024-11-19 PROCEDURE — 99396 PREV VISIT EST AGE 40-64: CPT | Mod: 25,S$GLB,, | Performed by: FAMILY MEDICINE

## 2024-11-19 PROCEDURE — 90471 IMMUNIZATION ADMIN: CPT | Mod: S$GLB,,, | Performed by: FAMILY MEDICINE

## 2024-11-19 PROCEDURE — 99999 PR PBB SHADOW E&M-EST. PATIENT-LVL IV: CPT | Mod: PBBFAC,,, | Performed by: FAMILY MEDICINE

## 2024-11-19 RX ORDER — BUPROPION HYDROCHLORIDE 300 MG/1
300 TABLET ORAL DAILY
Qty: 90 TABLET | Refills: 3 | Status: SHIPPED | OUTPATIENT
Start: 2024-11-19

## 2024-11-19 NOTE — PATIENT INSTRUCTIONS
Health Maintenance Due   Topic Date Due    Hepatitis C Screening  Never done    HIV Screening  Never done    TETANUS VACCINE  Never done    Shingles Vaccine (1 of 2) Never done    Influenza Vaccine (1) 09/01/2024    COVID-19 Vaccine (1 - 2024-25 season) Never done    Mammogram  01/24/2025

## 2024-11-19 NOTE — PROGRESS NOTES
Patient presents for physical exam.   Prior macrocytosis with negative hematology evaluation.  hemochromatosis carrier has not had lab evaluation on this in quite awhile.  Asymptomatic.  Anxiety/depression previously uses Wellbutrin through her gynecologist was now retired and she would like us to prescribe.  Doing well with the medication..    Remote tiny meningioma on outside CT with negative MRI asymptomatic    Amita was seen today for annual exam.    Diagnoses and all orders for this visit:    Routine history and physical examination of adult  -     HIV 1/2 Ag/Ab (4th Gen); Future  -     Hepatitis C Antibody; Future  -     Comprehensive Metabolic Panel; Future  -     CBC Auto Differential; Future  -     Iron and TIBC; Future  -     Ferritin; Future  -     Tdap (BOOSTRIX) vaccine injection 0.5 mL    Major depressive disorder in full remission, unspecified whether recurrent    Meningioma    Anxiety    Carrier of hemochromatosis HFE gene mutation  -     Comprehensive Metabolic Panel; Future  -     CBC Auto Differential; Future  -     Iron and TIBC; Future  -     Ferritin; Future    Screening for HIV (human immunodeficiency virus)  -     HIV 1/2 Ag/Ab (4th Gen); Future    Need for hepatitis C screening test  -     Hepatitis C Antibody; Future    Need for vaccination  -     Tdap (BOOSTRIX) vaccine injection 0.5 mL    Other orders  -     buPROPion (WELLBUTRIN XL) 300 MG 24 hr tablet; Take 1 tablet (300 mg total) by mouth once daily.    Continue current medication arrange above laboratory.  Mammogram scheduled already.  Reviewed recommended immunizations.  Anticipatory guidance: Don't smoke.  Healthy diet and regular exercise recommended.          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    COVID-19 virus infection 1/5/2022    Depression     Gastric ulcer with hemorrhage     H pylori  ulcer     History of blood transfusion     Meningioma 7/12/2023    Migraine headache      Past Surgical History:   Procedure Laterality Date    BREAST BIOPSY      HAND TENDON SURGERY      HYSTERECTOMY      ovaries intact    TYMPANOSTOMY      TYMPANOSTOMY TUBE PLACEMENT      urethral dilation       Review of patient's allergies indicates:   Allergen Reactions    Penicillin Hives     Current Outpatient Medications on File Prior to Visit   Medication Sig Dispense Refill    acetaminophen (TYLENOL) 325 MG tablet Take 325 mg by mouth every 6 (six) hours as needed for Pain.      estradiol (ESTRACE) 2 MG tablet Take 2 mg by mouth once daily.      [DISCONTINUED] buPROPion (WELLBUTRIN XL) 300 MG 24 hr tablet Take 300 mg by mouth once daily.       [DISCONTINUED] cephALEXin (KEFLEX) 500 MG capsule Take 500 mg by mouth every 6 (six) hours.      [DISCONTINUED] gabapentin (NEURONTIN) 300 MG capsule Take 300 mg by mouth every evening.       No current facility-administered medications on file prior to visit.     Social History     Socioeconomic History    Marital status:    Occupational History     Employer: [employer]   Tobacco Use    Smoking status: Never    Smokeless tobacco: Never   Substance and Sexual Activity    Alcohol use: Yes     Comment: occ 1x month    Drug use: No    Sexual activity: Yes     Partners: Male     Social Drivers of Health     Financial Resource Strain: Unknown (10/15/2020)    [place] [facility] [place] Financial Resource Strain (CARDIA)     Difficulty of Paying Living Expenses: Patient declined   Food Insecurity: Unknown (10/15/2020)    [place] [facility] [place]    Hunger Vital Sign     Worried About Running Out of Food in the Last Year: Patient declined     Ran Out of Food in the Last Year: Patient declined   Transportation Needs: Unknown (10/15/2020)    [facility]    PRAPARE - Transportation     Lack of Transportation (Medical): Patient declined     Lack of Transportation (Non-Medical): Patient declined   Physical Activity: Unknown (10/15/2020)    Received from [facility] [place]    Exercise Vital Sign     Days of Exercise per Week: Patient declined     Minutes of Exercise per Session: Patient declined   Stress: Unknown (10/15/2020)    Received from [facility] [place]    [place] [employer] - Occupational Stress Questionnaire     Feeling of Stress : Patient declined     Family History   Problem Relation Name Age of Onset    Hypertension Mother      Stroke Mother  72    Hypertension Father      Heart disease Maternal Grandmother      Diabetes Maternal Grandmother      Lung cancer Maternal Grandfather      Cancer Paternal Grandfather      Ovarian cancer Cousin         Answers submitted by the patient for this visit:  Review of Systems Questionnaire (Submitted on 11/12/2024)  activity change: No  unexpected weight change: No  neck pain: No  hearing loss: No  rhinorrhea: No  trouble swallowing: No  eye discharge: No  visual disturbance: No  chest tightness: No  wheezing: No  chest pain: No  palpitations: No  blood in stool: No  constipation: No  vomiting: No  diarrhea: No  polydipsia: No  polyuria: No  difficulty urinating: No  hematuria: No  menstrual problem: No  dysuria: No  joint swelling: No  arthralgias: No  headaches: No  weakness: No  confusion: No  dysphoric mood: No      Vitals:    11/19/24 1603   BP: 117/75   Pulse: 70   Temp: 97.3 °F (36.3 °C)   TempSrc: Temporal   Weight: 60.6 kg (133 lb 8 oz)   Height: 5' 5.5" (1.664 m)     Wt Readings from Last 3 Encounters:   11/19/24 60.6 kg (133 lb 8 oz)   07/12/23 72.4 kg (159 lb 11.2 oz)   12/15/22 69.9 kg (154 lb 1.6 oz)       OBJECTIVE:   APPEARANCE: Well nourished, well developed, in no acute distress.    HEAD: Normocephalic.  " Atraumatic.  No sinus tenderness.  EYES:   Right eye: Pupil reactive.  Conjunctiva clear.    Left eye: Pupil reactive.  Conjunctiva clear.  EOMI.    EARS: TM's intact. Light reflex normal. No retraction or perforation.    NOSE:  clear.  MOUTH & THROAT:  No pharyngeal erythema or exudate. No lesions.  NECK: Supple. No bruits.  No JVD.  No cervical lymphadenopathy.  No thyromegaly.    CHEST: Breath sounds clear bilaterally.  Normal respiratory effort  CARDIOVASCULAR: Normal rate.  Regular rhythm.  No murmurs.  No rub.  No gallops.  ABDOMEN: Bowel sounds normal.  Soft.  No tenderness.  No organomegaly.  PERIPHERAL VASCULAR: No cyanosis.  No clubbing.  No edema.  NEUROLOGIC: No focal findings.  MENTAL STATUS: Alert.  Oriented x 3.

## 2025-01-24 ENCOUNTER — HOSPITAL ENCOUNTER (OUTPATIENT)
Dept: RADIOLOGY | Facility: HOSPITAL | Age: 55
Discharge: HOME | End: 2025-01-24
Attending: FAMILY MEDICINE
Payer: COMMERCIAL

## 2025-01-24 DIAGNOSIS — Z12.31 ENCOUNTER FOR SCREENING MAMMOGRAM FOR BREAST CANCER: ICD-10-CM

## 2025-01-24 PROCEDURE — 77067 SCR MAMMO BI INCL CAD: CPT | Mod: 26,,, | Performed by: RADIOLOGY

## 2025-01-24 PROCEDURE — 77063 BREAST TOMOSYNTHESIS BI: CPT | Mod: TC,PO

## 2025-01-24 PROCEDURE — 77063 BREAST TOMOSYNTHESIS BI: CPT | Mod: 26,,, | Performed by: RADIOLOGY

## 2025-01-27 ENCOUNTER — TELEPHONE (OUTPATIENT)
Dept: RADIOLOGY | Facility: HOSPITAL | Age: 55
End: 2025-01-27
Payer: COMMERCIAL

## 2025-02-06 ENCOUNTER — HOSPITAL ENCOUNTER (OUTPATIENT)
Dept: RADIOLOGY | Facility: HOSPITAL | Age: 55
Discharge: HOME | End: 2025-02-06
Attending: FAMILY MEDICINE
Payer: COMMERCIAL

## 2025-02-06 DIAGNOSIS — R92.8 ABNORMAL MAMMOGRAM: ICD-10-CM

## 2025-02-06 PROCEDURE — 77065 DX MAMMO INCL CAD UNI: CPT | Mod: 26,LT,, | Performed by: RADIOLOGY

## 2025-02-06 PROCEDURE — 77061 BREAST TOMOSYNTHESIS UNI: CPT | Mod: 26,LT,, | Performed by: RADIOLOGY

## 2025-02-06 PROCEDURE — 77061 BREAST TOMOSYNTHESIS UNI: CPT | Mod: TC,PO,LT

## 2025-02-13 ENCOUNTER — TELEPHONE (OUTPATIENT)
Dept: FAMILY MEDICINE | Facility: CLINIC | Age: 55
End: 2025-02-13
Payer: COMMERCIAL

## 2025-02-13 DIAGNOSIS — E87.6 HYPOKALEMIA: Primary | ICD-10-CM

## 2025-02-13 DIAGNOSIS — D75.89 MACROCYTOSIS: ICD-10-CM

## 2025-02-13 NOTE — TELEPHONE ENCOUNTER
----- Message from Beltran Velasco MD sent at 2/11/2025  5:49 PM CST -----  Potassium mildly low.  Blood cells enlarged similar previous.  No anemia.  Iron tests normal.  Recommend potassium chloride 10 mEq daily x2 weeks.  Recheck potassium level, TSH, B12, folate 1 month.  Recheck CBC 6 months.  Make sure not drinking excess alcohol. My nurse will contact you to arrange.  Thanks,  Dr. Velasco

## 2025-08-08 ENCOUNTER — LAB VISIT (OUTPATIENT)
Dept: LAB | Facility: HOSPITAL | Age: 55
End: 2025-08-08
Attending: FAMILY MEDICINE
Payer: COMMERCIAL

## 2025-08-08 DIAGNOSIS — E87.6 HYPOKALEMIA: ICD-10-CM

## 2025-08-08 DIAGNOSIS — D75.89 MACROCYTOSIS: ICD-10-CM

## 2025-08-08 LAB
ERYTHROCYTE [DISTWIDTH] IN BLOOD BY AUTOMATED COUNT: 11.9 % (ref 11.5–14.5)
FOLATE SERPL-MCNC: 18.8 NG/ML (ref 4–24)
HCT VFR BLD AUTO: 39.5 % (ref 37–48.5)
HGB BLD-MCNC: 13.2 GM/DL (ref 12–16)
MCH RBC QN AUTO: 33.5 PG (ref 27–31)
MCHC RBC AUTO-ENTMCNC: 33.4 G/DL (ref 32–36)
MCV RBC AUTO: 100 FL (ref 82–98)
PLATELET # BLD AUTO: 222 K/UL (ref 150–450)
PMV BLD AUTO: 11.5 FL (ref 9.2–12.9)
POTASSIUM SERPL-SCNC: 3.8 MMOL/L (ref 3.5–5.1)
RBC # BLD AUTO: 3.94 M/UL (ref 4–5.4)
TSH SERPL-ACNC: 3.42 UIU/ML (ref 0.4–4)
VIT B12 SERPL-MCNC: 815 PG/ML (ref 210–950)
WBC # BLD AUTO: 5.56 K/UL (ref 3.9–12.7)

## 2025-08-08 PROCEDURE — 82746 ASSAY OF FOLIC ACID SERUM: CPT

## 2025-08-08 PROCEDURE — 85027 COMPLETE CBC AUTOMATED: CPT

## 2025-08-08 PROCEDURE — 84443 ASSAY THYROID STIM HORMONE: CPT

## 2025-08-08 PROCEDURE — 36415 COLL VENOUS BLD VENIPUNCTURE: CPT | Mod: PO

## 2025-08-08 PROCEDURE — 84132 ASSAY OF SERUM POTASSIUM: CPT

## 2025-08-08 PROCEDURE — 82607 VITAMIN B-12: CPT
